# Patient Record
Sex: MALE | Race: WHITE | NOT HISPANIC OR LATINO | Employment: FULL TIME | URBAN - NONMETROPOLITAN AREA
[De-identification: names, ages, dates, MRNs, and addresses within clinical notes are randomized per-mention and may not be internally consistent; named-entity substitution may affect disease eponyms.]

---

## 2017-02-07 ENCOUNTER — OFFICE VISIT (OUTPATIENT)
Dept: RETAIL CLINIC | Facility: CLINIC | Age: 47
End: 2017-02-07

## 2017-02-07 VITALS
SYSTOLIC BLOOD PRESSURE: 144 MMHG | OXYGEN SATURATION: 98 % | TEMPERATURE: 98 F | BODY MASS INDEX: 21.4 KG/M2 | WEIGHT: 158 LBS | RESPIRATION RATE: 16 BRPM | HEART RATE: 70 BPM | HEIGHT: 72 IN | DIASTOLIC BLOOD PRESSURE: 94 MMHG

## 2017-02-07 DIAGNOSIS — R42 DIZZINESS: ICD-10-CM

## 2017-02-07 DIAGNOSIS — H66.003 ACUTE SUPPURATIVE OTITIS MEDIA OF BOTH EARS WITHOUT SPONTANEOUS RUPTURE OF TYMPANIC MEMBRANES, RECURRENCE NOT SPECIFIED: Primary | ICD-10-CM

## 2017-02-07 PROCEDURE — 99213 OFFICE O/P EST LOW 20 MIN: CPT | Performed by: NURSE PRACTITIONER

## 2017-02-07 RX ORDER — AMOXICILLIN AND CLAVULANATE POTASSIUM 875; 125 MG/1; MG/1
1 TABLET, FILM COATED ORAL 2 TIMES DAILY
Qty: 20 TABLET | Refills: 0 | Status: SHIPPED | OUTPATIENT
Start: 2017-02-07 | End: 2017-02-17

## 2017-02-07 NOTE — PROGRESS NOTES
Subjective   Tyree Sears is a 46 y.o. male.   Chief Complaint   Patient presents with   • Dizziness      Dizziness   This is a new problem. The current episode started in the past 7 days. The problem occurs intermittently. The problem has been waxing and waning. Associated symptoms include congestion, fatigue and headaches. Pertinent negatives include no abdominal pain, anorexia, arthralgias, change in bowel habit, chest pain, chills, coughing, diaphoresis, fever, joint swelling, myalgias, nausea, neck pain, numbness, rash, sore throat, vomiting or weakness. The symptoms are aggravated by bending. He has tried nothing for the symptoms.   URI    This is a new problem. The current episode started in the past 7 days. The problem has been gradually worsening. There has been no fever. Associated symptoms include congestion, ear pain, headaches, rhinorrhea and sinus pain. Pertinent negatives include no abdominal pain, chest pain, coughing, diarrhea, joint pain, joint swelling, nausea, neck pain, rash, sore throat, vomiting or wheezing. He has tried nothing for the symptoms.        The following portions of the patient's history were reviewed and updated as appropriate: allergies, current medications, past family history, past medical history, past social history, past surgical history and problem list.    Review of Systems   Constitutional: Positive for fatigue. Negative for chills, diaphoresis and fever.   HENT: Positive for congestion, ear pain, postnasal drip, rhinorrhea and sinus pressure. Negative for drooling, ear discharge, sore throat and trouble swallowing.    Respiratory: Negative for cough, choking, chest tightness, shortness of breath, wheezing and stridor.    Cardiovascular: Negative for chest pain.   Gastrointestinal: Negative for abdominal pain, anorexia, change in bowel habit, diarrhea, nausea and vomiting.   Musculoskeletal: Negative for arthralgias, joint pain, joint swelling, myalgias and neck pain.    Skin: Negative for rash.   Allergic/Immunologic: Positive for environmental allergies.   Neurological: Positive for dizziness and headaches. Negative for seizures, facial asymmetry, weakness and numbness.   Hematological: Negative.    Psychiatric/Behavioral: Negative.        Objective   No Known Allergies    Physical Exam   Constitutional: He is oriented to person, place, and time. He appears well-developed and well-nourished. No distress.   HENT:   Head: Normocephalic and atraumatic.   Right Ear: External ear and ear canal normal. Tympanic membrane is erythematous and bulging. A middle ear effusion is present.   Left Ear: External ear and ear canal normal. Tympanic membrane is erythematous and bulging. A middle ear effusion is present.   Nose: Mucosal edema present. Right sinus exhibits no maxillary sinus tenderness and no frontal sinus tenderness. Left sinus exhibits no maxillary sinus tenderness and no frontal sinus tenderness.   Mouth/Throat: Uvula is midline and mucous membranes are normal. Mucous membranes are not pale, not dry and not cyanotic. Posterior oropharyngeal erythema (mild) present.   Neck: Normal range of motion. Neck supple.   Cardiovascular: Normal heart sounds.    Pulmonary/Chest: Effort normal and breath sounds normal. No respiratory distress. He has no wheezes. He has no rales.   Lymphadenopathy:     He has no cervical adenopathy.   Neurological: He is alert and oriented to person, place, and time.   Skin: Skin is warm and dry. No rash noted. He is not diaphoretic.   Psychiatric: He has a normal mood and affect.       Assessment/Plan   Tyree was seen today for dizziness.    Diagnoses and all orders for this visit:    Acute suppurative otitis media of both ears without spontaneous rupture of tympanic membranes, recurrence not specified  -     amoxicillin-clavulanate (AUGMENTIN) 875-125 MG per tablet; Take 1 tablet by mouth 2 (Two) Times a Day for 10 days.    Dizziness    Advised patient to  begin OTC antihistamine such as Claritin or Zyrtec and OTC nasal steroid spray such as Flonase or Nasacort.             Patient Instructions   Otitis Media, Adult  Otitis media is redness, soreness, and inflammation of the middle ear. Otitis media may be caused by allergies or, most commonly, by infection. Often it occurs as a complication of the common cold.  SIGNS AND SYMPTOMS  Symptoms of otitis media may include:  · Earache.  · Fever.  · Ringing in your ear.  · Headache.  · Leakage of fluid from the ear.  DIAGNOSIS  To diagnose otitis media, your health care provider will examine your ear with an otoscope. This is an instrument that allows your health care provider to see into your ear in order to examine your eardrum. Your health care provider also will ask you questions about your symptoms.  TREATMENT   Typically, otitis media resolves on its own within 3-5 days. Your health care provider may prescribe medicine to ease your symptoms of pain. If otitis media does not resolve within 5 days or is recurrent, your health care provider may prescribe antibiotic medicines if he or she suspects that a bacterial infection is the cause.  HOME CARE INSTRUCTIONS   · If you were prescribed an antibiotic medicine, finish it all even if you start to feel better.  · Take medicines only as directed by your health care provider.  · Keep all follow-up visits as directed by your health care provider.  SEEK MEDICAL CARE IF:  · You have otitis media only in one ear, or bleeding from your nose, or both.  · You notice a lump on your neck.  · You are not getting better in 3-5 days.  · You feel worse instead of better.  SEEK IMMEDIATE MEDICAL CARE IF:   · You have pain that is not controlled with medicine.  · You have swelling, redness, or pain around your ear or stiffness in your neck.  · You notice that part of your face is paralyzed.  · You notice that the bone behind your ear (mastoid) is tender when you touch it.  MAKE SURE YOU:    · Understand these instructions.  · Will watch your condition.  · Will get help right away if you are not doing well or get worse.     This information is not intended to replace advice given to you by your health care provider. Make sure you discuss any questions you have with your health care provider.     Document Released: 09/22/2005 Document Revised: 01/08/2016 Document Reviewed: 07/15/2014  [employer] Interactive Patient Education ©2016 [employer].  Amoxicillin; Clavulanic Acid tablets  What is this medicine?  AMOXICILLIN; CLAVULANIC ACID (a mox i REDDY in; LOURDES [place]jarano ic AS id) is a penicillin antibiotic. It is used to treat certain kinds of bacterial infections. It will not work for colds, flu, or other viral infections.  This medicine may be used for other purposes; ask your health care provider or pharmacist if you have questions.  What should I tell my health care provider before I take this medicine?  They need to know if you have any of these conditions:  -bowel disease, like colitis  -kidney disease  -liver disease  -mononucleosis  -an unusual or allergic reaction to amoxicillin, penicillin, cephalosporin, other antibiotics, clavulanic acid, other medicines, foods, dyes, or preservatives  -pregnant or trying to get pregnant  -breast-feeding  How should I use this medicine?  Take this medicine by mouth with a full glass of water. Follow the directions on the prescription label. Take at the start of a meal. Do not crush or chew. If the tablet has a score line, you may cut it in half at the score line for easier swallowing. Take your medicine at regular intervals. Do not take your medicine more often than directed. Take all of your medicine as directed even if you think you are better. Do not skip doses or stop your medicine early.  Talk to your pediatrician regarding the use of this medicine in children. Special care may be needed.  Overdosage: If you think you have taken too much of this medicine  contact a poison control center or emergency room at once.  NOTE: This medicine is only for you. Do not share this medicine with others.  What if I miss a dose?  If you miss a dose, take it as soon as you can. If it is almost time for your next dose, take only that dose. Do not take double or extra doses.  What may interact with this medicine?  -allopurinol  -anticoagulants  -birth control pills  -methotrexate  -probenecid  This list may not describe all possible interactions. Give your health care provider a list of all the medicines, herbs, non-prescription drugs, or dietary supplements you use. Also tell them if you smoke, drink alcohol, or use illegal drugs. Some items may interact with your medicine.  What should I watch for while using this medicine?  Tell your doctor or health care professional if your symptoms do not improve.  Do not treat diarrhea with over the counter products. Contact your doctor if you have diarrhea that lasts more than 2 days or if it is severe and watery.  If you have diabetes, you may get a false-positive result for sugar in your urine. Check with your doctor or health care professional.  Birth control pills may not work properly while you are taking this medicine. Talk to your doctor about using an extra method of birth control.  What side effects may I notice from receiving this medicine?  Side effects that you should report to your doctor or health care professional as soon as possible:  -allergic reactions like skin rash, itching or hives, swelling of the face, lips, or tongue  -breathing problems  -dark urine  -fever or chills, sore throat  -redness, blistering, peeling or loosening of the skin, including inside the mouth  -seizures  -trouble passing urine or change in the amount of urine  -unusual bleeding, bruising  -unusually weak or tired  -white patches or sores in the mouth or throat  Side effects that usually do not require medical attention (report to your doctor or  health care professional if they continue or are bothersome):  -diarrhea  -dizziness  -headache  -nausea, vomiting  -stomach upset  -vaginal or anal irritation  This list may not describe all possible side effects. Call your doctor for medical advice about side effects. You may report side effects to FDA at 6-295-GYV-3123.  Where should I keep my medicine?  Keep out of the reach of children.  Store at room temperature below 25 degrees C (77 degrees F). Keep container tightly closed. Throw away any unused medicine after the expiration date.  NOTE: This sheet is a summary. It may not cover all possible information. If you have questions about this medicine, talk to your doctor, pharmacist, or health care provider.     © 2016, [employer]. (2009-03-12 12:04:30)  Vertigo  Vertigo means you feel like you or your surroundings are moving when they are not. Vertigo can be dangerous if it occurs when you are at work, driving, or performing difficult activities.   CAUSES   Vertigo occurs when there is a conflict of signals sent to your brain from the visual and sensory systems in your body. There are many different causes of vertigo, including:  · Infections, especially in the inner ear.  · A bad reaction to a drug or misuse of alcohol and medicines.  · Withdrawal from drugs or alcohol.  · Rapidly changing positions, such as lying down or rolling over in bed.  · A migraine headache.  · Decreased blood flow to the brain.  · Increased pressure in the brain from a head injury, infection, tumor, or bleeding.  SYMPTOMS   You may feel as though the world is spinning around or you are falling to the ground. Because your balance is upset, vertigo can cause nausea and vomiting. You may have involuntary eye movements (nystagmus).  DIAGNOSIS   Vertigo is usually diagnosed by physical exam. If the cause of your vertigo is unknown, your caregiver may perform imaging tests, such as an MRI scan (magnetic resonance  imaging).  TREATMENT   Most cases of vertigo resolve on their own, without treatment. Depending on the cause, your caregiver may prescribe certain medicines. If your vertigo is related to body position issues, your caregiver may recommend movements or procedures to correct the problem. In rare cases, if your vertigo is caused by certain inner ear problems, you may need surgery.  HOME CARE INSTRUCTIONS   · Follow your caregiver's instructions.  · Avoid driving.  · Avoid operating heavy machinery.  · Avoid performing any tasks that would be dangerous to you or others during a vertigo episode.  · Tell your caregiver if you notice that certain medicines seem to be causing your vertigo. Some of the medicines used to treat vertigo episodes can actually make them worse in some people.  SEEK IMMEDIATE MEDICAL CARE IF:   · Your medicines do not relieve your vertigo or are making it worse.  · You develop problems with talking, walking, weakness, or using your arms, hands, or legs.  · You develop severe headaches.  · Your nausea or vomiting continues or gets worse.  · You develop visual changes.  · A family member notices behavioral changes.  · Your condition gets worse.  MAKE SURE YOU:  · Understand these instructions.  · Will watch your condition.  · Will get help right away if you are not doing well or get worse.     This information is not intended to replace advice given to you by your health care provider. Make sure you discuss any questions you have with your health care provider.     Document Released: 09/27/2006 Document Revised: 03/11/2013 Document Reviewed: 04/11/2016  [employer] Interactive Patient Education ©2016 [employer].

## 2017-02-07 NOTE — PATIENT INSTRUCTIONS
Otitis Media, Adult  Otitis media is redness, soreness, and inflammation of the middle ear. Otitis media may be caused by allergies or, most commonly, by infection. Often it occurs as a complication of the common cold.  SIGNS AND SYMPTOMS  Symptoms of otitis media may include:  · Earache.  · Fever.  · Ringing in your ear.  · Headache.  · Leakage of fluid from the ear.  DIAGNOSIS  To diagnose otitis media, your health care provider will examine your ear with an otoscope. This is an instrument that allows your health care provider to see into your ear in order to examine your eardrum. Your health care provider also will ask you questions about your symptoms.  TREATMENT   Typically, otitis media resolves on its own within 3-5 days. Your health care provider may prescribe medicine to ease your symptoms of pain. If otitis media does not resolve within 5 days or is recurrent, your health care provider may prescribe antibiotic medicines if he or she suspects that a bacterial infection is the cause.  HOME CARE INSTRUCTIONS   · If you were prescribed an antibiotic medicine, finish it all even if you start to feel better.  · Take medicines only as directed by your health care provider.  · Keep all follow-up visits as directed by your health care provider.  SEEK MEDICAL CARE IF:  · You have otitis media only in one ear, or bleeding from your nose, or both.  · You notice a lump on your neck.  · You are not getting better in 3-5 days.  · You feel worse instead of better.  SEEK IMMEDIATE MEDICAL CARE IF:   · You have pain that is not controlled with medicine.  · You have swelling, redness, or pain around your ear or stiffness in your neck.  · You notice that part of your face is paralyzed.  · You notice that the bone behind your ear (mastoid) is tender when you touch it.  MAKE SURE YOU:   · Understand these instructions.  · Will watch your condition.  · Will get help right away if you are not doing well or get worse.     This  information is not intended to replace advice given to you by your health care provider. Make sure you discuss any questions you have with your health care provider.     Document Released: 09/22/2005 Document Revised: 01/08/2016 Document Reviewed: 07/15/2014  [employer] Interactive Patient Education ©2016 [employer].  Amoxicillin; Clavulanic Acid tablets  What is this medicine?  AMOXICILLIN; CLAVULANIC ACID (a mox i REDDY in; LOURDES [place] ic AS id) is a penicillin antibiotic. It is used to treat certain kinds of bacterial infections. It will not work for colds, flu, or other viral infections.  This medicine may be used for other purposes; ask your health care provider or pharmacist if you have questions.  What should I tell my health care provider before I take this medicine?  They need to know if you have any of these conditions:  -bowel disease, like colitis  -kidney disease  -liver disease  -mononucleosis  -an unusual or allergic reaction to amoxicillin, penicillin, cephalosporin, other antibiotics, clavulanic acid, other medicines, foods, dyes, or preservatives  -pregnant or trying to get pregnant  -breast-feeding  How should I use this medicine?  Take this medicine by mouth with a full glass of water. Follow the directions on the prescription label. Take at the start of a meal. Do not crush or chew. If the tablet has a score line, you may cut it in half at the score line for easier swallowing. Take your medicine at regular intervals. Do not take your medicine more often than directed. Take all of your medicine as directed even if you think you are better. Do not skip doses or stop your medicine early.  Talk to your pediatrician regarding the use of this medicine in children. Special care may be needed.  Overdosage: If you think you have taken too much of this medicine contact a poison control center or emergency room at once.  NOTE: This medicine is only for you. Do not share this medicine with others.  What if I  miss a dose?  If you miss a dose, take it as soon as you can. If it is almost time for your next dose, take only that dose. Do not take double or extra doses.  What may interact with this medicine?  -allopurinol  -anticoagulants  -birth control pills  -methotrexate  -probenecid  This list may not describe all possible interactions. Give your health care provider a list of all the medicines, herbs, non-prescription drugs, or dietary supplements you use. Also tell them if you smoke, drink alcohol, or use illegal drugs. Some items may interact with your medicine.  What should I watch for while using this medicine?  Tell your doctor or health care professional if your symptoms do not improve.  Do not treat diarrhea with over the counter products. Contact your doctor if you have diarrhea that lasts more than 2 days or if it is severe and watery.  If you have diabetes, you may get a false-positive result for sugar in your urine. Check with your doctor or health care professional.  Birth control pills may not work properly while you are taking this medicine. Talk to your doctor about using an extra method of birth control.  What side effects may I notice from receiving this medicine?  Side effects that you should report to your doctor or health care professional as soon as possible:  -allergic reactions like skin rash, itching or hives, swelling of the face, lips, or tongue  -breathing problems  -dark urine  -fever or chills, sore throat  -redness, blistering, peeling or loosening of the skin, including inside the mouth  -seizures  -trouble passing urine or change in the amount of urine  -unusual bleeding, bruising  -unusually weak or tired  -white patches or sores in the mouth or throat  Side effects that usually do not require medical attention (report to your doctor or health care professional if they continue or are bothersome):  -diarrhea  -dizziness  -headache  -nausea, vomiting  -stomach upset  -vaginal or anal  irritation  This list may not describe all possible side effects. Call your doctor for medical advice about side effects. You may report side effects to FDA at 1-292-HPC-7577.  Where should I keep my medicine?  Keep out of the reach of children.  Store at room temperature below 25 degrees C (77 degrees F). Keep container tightly closed. Throw away any unused medicine after the expiration date.  NOTE: This sheet is a summary. It may not cover all possible information. If you have questions about this medicine, talk to your doctor, pharmacist, or health care provider.     © 2016, [employer]. (2009-03-12 12:04:30)  Vertigo  Vertigo means you feel like you or your surroundings are moving when they are not. Vertigo can be dangerous if it occurs when you are at work, driving, or performing difficult activities.   CAUSES   Vertigo occurs when there is a conflict of signals sent to your brain from the visual and sensory systems in your body. There are many different causes of vertigo, including:  · Infections, especially in the inner ear.  · A bad reaction to a drug or misuse of alcohol and medicines.  · Withdrawal from drugs or alcohol.  · Rapidly changing positions, such as lying down or rolling over in bed.  · A migraine headache.  · Decreased blood flow to the brain.  · Increased pressure in the brain from a head injury, infection, tumor, or bleeding.  SYMPTOMS   You may feel as though the world is spinning around or you are falling to the ground. Because your balance is upset, vertigo can cause nausea and vomiting. You may have involuntary eye movements (nystagmus).  DIAGNOSIS   Vertigo is usually diagnosed by physical exam. If the cause of your vertigo is unknown, your caregiver may perform imaging tests, such as an MRI scan (magnetic resonance imaging).  TREATMENT   Most cases of vertigo resolve on their own, without treatment. Depending on the cause, your caregiver may prescribe certain medicines. If  your vertigo is related to body position issues, your caregiver may recommend movements or procedures to correct the problem. In rare cases, if your vertigo is caused by certain inner ear problems, you may need surgery.  HOME CARE INSTRUCTIONS   · Follow your caregiver's instructions.  · Avoid driving.  · Avoid operating heavy machinery.  · Avoid performing any tasks that would be dangerous to you or others during a vertigo episode.  · Tell your caregiver if you notice that certain medicines seem to be causing your vertigo. Some of the medicines used to treat vertigo episodes can actually make them worse in some people.  SEEK IMMEDIATE MEDICAL CARE IF:   · Your medicines do not relieve your vertigo or are making it worse.  · You develop problems with talking, walking, weakness, or using your arms, hands, or legs.  · You develop severe headaches.  · Your nausea or vomiting continues or gets worse.  · You develop visual changes.  · A family member notices behavioral changes.  · Your condition gets worse.  MAKE SURE YOU:  · Understand these instructions.  · Will watch your condition.  · Will get help right away if you are not doing well or get worse.     This information is not intended to replace advice given to you by your health care provider. Make sure you discuss any questions you have with your health care provider.     Document Released: 09/27/2006 Document Revised: 03/11/2013 Document Reviewed: 04/11/2016  [employer] Interactive Patient Education ©2016 [employer].

## 2019-11-01 ENCOUNTER — OFFICE VISIT (OUTPATIENT)
Dept: FAMILY MEDICINE CLINIC | Facility: CLINIC | Age: 49
End: 2019-11-01

## 2019-11-01 VITALS
RESPIRATION RATE: 16 BRPM | OXYGEN SATURATION: 99 % | HEIGHT: 68 IN | DIASTOLIC BLOOD PRESSURE: 98 MMHG | BODY MASS INDEX: 25.94 KG/M2 | HEART RATE: 70 BPM | SYSTOLIC BLOOD PRESSURE: 162 MMHG | WEIGHT: 171.2 LBS

## 2019-11-01 DIAGNOSIS — G89.29 CHRONIC RIGHT SHOULDER PAIN: Primary | ICD-10-CM

## 2019-11-01 DIAGNOSIS — M25.511 CHRONIC RIGHT SHOULDER PAIN: Primary | ICD-10-CM

## 2019-11-01 PROCEDURE — 99213 OFFICE O/P EST LOW 20 MIN: CPT | Performed by: PHYSICIAN ASSISTANT

## 2019-11-01 RX ORDER — NABUMETONE 500 MG/1
500 TABLET, FILM COATED ORAL 2 TIMES DAILY PRN
Qty: 20 TABLET | Refills: 0 | Status: SHIPPED | OUTPATIENT
Start: 2019-11-01 | End: 2019-12-05

## 2019-11-01 NOTE — PROGRESS NOTES
Subjective   Tyree Sears is a 49 y.o. male  Establish Care (Prev pt of Dr. Ramos ([facility])) and Shoulder Pain (Rt shoulder for several months, burning sensation down to hand. Pain is constant 8/10. No known injury-treating with ibuprofen.)      History of Present Illness  Patient is a pleasant 49-year-old white male who comes in complain right shoulder pain patient is unable to put arm above head describes pain is 8 out of 10 has had x-rays done unremarkable unable to sleep on left shoulder describes pain as sharp stabbing 9 out of 10 worse when sitting standing for long periods time any kind of movement above his head causes pain.  The following portions of the patient's history were reviewed and updated as appropriate: allergies, current medications, past social history and problem list    Review of Systems   Constitutional: Negative for appetite change, diaphoresis, fatigue and unexpected weight change.   Eyes: Negative for visual disturbance.   Respiratory: Negative for cough, chest tightness and shortness of breath.    Cardiovascular: Negative for chest pain, palpitations and leg swelling.   Gastrointestinal: Negative for diarrhea, nausea and vomiting.   Endocrine: Negative for polydipsia, polyphagia and polyuria.   Musculoskeletal:        Rt shoulder pain   Skin: Negative for color change and rash.   Neurological: Negative for dizziness, syncope, weakness, light-headedness, numbness and headaches.       Objective     Vitals:    11/01/19 1059   BP: 162/98   Pulse: 70   Resp: 16   SpO2: 99%       Physical Exam   Constitutional: He appears well-developed and well-nourished.   Neck: No JVD present.   Cardiovascular: Normal rate, regular rhythm, normal heart sounds, intact distal pulses and normal pulses.   No murmur heard.  Pulmonary/Chest: Effort normal and breath sounds normal. No respiratory distress.   Abdominal: Soft. Bowel sounds are normal. There is no hepatosplenomegaly. There is no tenderness.    Musculoskeletal: He exhibits no edema.        Right shoulder: He exhibits decreased range of motion and tenderness.        Left shoulder: He exhibits decreased range of motion, tenderness and bony tenderness.        Arms:  Skin: Skin is warm and dry.   Nursing note and vitals reviewed.      Assessment/Plan     Diagnoses and all orders for this visit:    Chronic right shoulder pain  -     nabumetone (RELAFEN) 500 MG tablet; Take 1 tablet by mouth 2 (Two) Times a Day As Needed for Mild Pain .  -     MRI Shoulder Right Without Contrast; Future    Follow-up as needed

## 2019-11-12 ENCOUNTER — HOSPITAL ENCOUNTER (OUTPATIENT)
Dept: MRI IMAGING | Facility: HOSPITAL | Age: 49
Discharge: HOME | End: 2019-11-12
Admitting: PHYSICIAN ASSISTANT

## 2019-11-12 DIAGNOSIS — G89.29 CHRONIC RIGHT SHOULDER PAIN: ICD-10-CM

## 2019-11-12 DIAGNOSIS — M25.511 CHRONIC RIGHT SHOULDER PAIN: ICD-10-CM

## 2019-11-12 PROCEDURE — 73221 MRI JOINT UPR EXTREM W/O DYE: CPT

## 2019-11-13 ENCOUNTER — TRANSCRIBE ORDERS (OUTPATIENT)
Dept: FAMILY MEDICINE CLINIC | Facility: CLINIC | Age: 49
End: 2019-11-13

## 2019-11-13 DIAGNOSIS — M75.111 INCOMPLETE ROTATOR CUFF TEAR OR RUPTURE OF RIGHT SHOULDER, NOT SPECIFIED AS TRAUMATIC: Primary | ICD-10-CM

## 2019-12-05 ENCOUNTER — OFFICE VISIT (OUTPATIENT)
Dept: ORTHOPEDIC SURGERY | Facility: CLINIC | Age: 49
End: 2019-12-05

## 2019-12-05 VITALS — BODY MASS INDEX: 25.07 KG/M2 | OXYGEN SATURATION: 98 % | HEIGHT: 68 IN | WEIGHT: 165.4 LBS | HEART RATE: 90 BPM

## 2019-12-05 DIAGNOSIS — M75.01 ADHESIVE CAPSULITIS OF RIGHT SHOULDER: ICD-10-CM

## 2019-12-05 DIAGNOSIS — M25.511 RIGHT SHOULDER PAIN, UNSPECIFIED CHRONICITY: Primary | ICD-10-CM

## 2019-12-05 DIAGNOSIS — M75.111 INCOMPLETE TEAR OF RIGHT ROTATOR CUFF, UNSPECIFIED WHETHER TRAUMATIC: ICD-10-CM

## 2019-12-05 DIAGNOSIS — M19.011 ARTHRITIS OF RIGHT ACROMIOCLAVICULAR JOINT: ICD-10-CM

## 2019-12-05 DIAGNOSIS — M54.2 CERVICALGIA: ICD-10-CM

## 2019-12-05 PROCEDURE — 20605 DRAIN/INJ JOINT/BURSA W/O US: CPT | Performed by: ORTHOPAEDIC SURGERY

## 2019-12-05 PROCEDURE — 99204 OFFICE O/P NEW MOD 45 MIN: CPT | Performed by: ORTHOPAEDIC SURGERY

## 2019-12-05 RX ORDER — TRIAMCINOLONE ACETONIDE 40 MG/ML
20 INJECTION, SUSPENSION INTRA-ARTICULAR; INTRAMUSCULAR
Status: COMPLETED | OUTPATIENT
Start: 2019-12-05 | End: 2019-12-05

## 2019-12-05 RX ORDER — LIDOCAINE HYDROCHLORIDE 10 MG/ML
1 INJECTION, SOLUTION EPIDURAL; INFILTRATION; INTRACAUDAL; PERINEURAL
Status: COMPLETED | OUTPATIENT
Start: 2019-12-05 | End: 2019-12-05

## 2019-12-05 RX ORDER — IBUPROFEN 200 MG
200 TABLET ORAL EVERY 6 HOURS PRN
COMMUNITY
End: 2021-04-20

## 2019-12-05 RX ADMIN — TRIAMCINOLONE ACETONIDE 20 MG: 40 INJECTION, SUSPENSION INTRA-ARTICULAR; INTRAMUSCULAR at 11:17

## 2019-12-05 RX ADMIN — LIDOCAINE HYDROCHLORIDE 1 ML: 10 INJECTION, SOLUTION EPIDURAL; INFILTRATION; INTRACAUDAL; PERINEURAL at 11:17

## 2019-12-05 NOTE — PROGRESS NOTES
[facility] [place]    Subjective     Pain of the Right Shoulder (Right shoulder pain; Ongoing about 1 year-worse last 3 months; Shoulder injury about 15 years ago; Pain 7-8/10; Ibuprofen and Tylenol help some; soaking in hot water helps the most)      ALBERT Sears is a 49 y.o. male.  Patient is here today for evaluation of right shoulder pain.  This has been going on for the past year but is worsened over the past 3 months.  He is right-hand dominant.  He is tried over-the-counter ibuprofen and Tylenol which is helped him a little bit.  His main complaints are of pain in the shoulder and burning that radiates down the arm into the forearm and into the dorsum of his hand.  He has a bad neck as well.  He is here at the request of Brendan Ferguson for further evaluation and treatment.    Past Medical History:   Diagnosis Date   • Arthritis    • GERD (gastroesophageal reflux disease)    • Headache    • Sinusitis       History reviewed. No pertinent surgical history.   Family History   Problem Relation Age of Onset   • Lung disease Mother    • Cancer Mother    • Heart disease Father      Social History     Socioeconomic History   • Marital status: Unknown     Spouse name: Not on file   • Number of children: Not on file   • Years of education: Not on file   • Highest education level: Not on file   Tobacco Use   • Smoking status: Current Every Day Smoker     Packs/day: 2.00     Years: 20.00     Pack years: 40.00     Types: Cigarettes   • Smokeless tobacco: Never Used   Substance and Sexual Activity   • Alcohol use: No   • Drug use: No   • Sexual activity: Yes     Partners: Female      Current Outpatient Medications on File Prior to Visit   Medication Sig Dispense Refill   • ibuprofen (ADVIL,MOTRIN) 200 MG tablet Take 200 mg by mouth Every 6 (Six) Hours As Needed for Mild Pain .     • [DISCONTINUED] nabumetone (RELAFEN) 500 MG tablet Take 1 tablet by mouth 2 (Two) Times a Day As Needed for Mild Pain  ". 20 tablet 0     No current facility-administered medications on file prior to visit.       No Known Allergies     The following portions of the patient's history were reviewed and updated as appropriate: allergies, current medications, past family history, past medical history, past social history, past surgical history and problem list.    Review of Systems   Constitutional: Negative.    HENT: Negative.    Eyes: Positive for itching.   Respiratory: Positive for wheezing.    Cardiovascular: Negative.    Endocrine: Negative.    Genitourinary: Negative.    Musculoskeletal: Positive for arthralgias, neck pain and neck stiffness.   Skin: Negative.    Allergic/Immunologic: Negative.    Neurological: Negative.    Hematological: Negative.    Psychiatric/Behavioral: Negative.         Objective      Physical Exam  Pulse 90   Ht 172.7 cm (67.99\")   Wt 75 kg (165 lb 6.4 oz)   SpO2 98%   BMI 25.15 kg/m²     Body mass index is 25.15 kg/m².    General  Mental Status - alert  General Appearance - cooperative, well groomed, not in acute distress  Orientation - Oriented X3  Build & Nutrition - well developed and well nourished  Posture - normal posture  Gait - normal gait     Integumentary  Global Assessment  Examination of related systems reveals - no lymphadenopathy  Ears:  No abnormality  Nose:  No mucous drainage  General Characteristics  Overall examination of the patient's skin reveals - no rashes, no evidence of scars, no suspicious lesions and no bruises.  Color - normal coloration of skin.  Vascular: Brisk capillary refill in all extremities    Ortho Exam  Musculoskeletal   Upper Extremity   Right Shoulder     Inspection and Palpation:     Medial border scapular tenderness-none    AC Joint Tenderness -moderate    Sensation is normal    Examination reveals no ecchymosis.        Strength and Tone:    Supraspinatus - 5/5 with mild pain    External Rotators-5/5    Infraspinatus - 5/5    Subscapularis - 5/5 with mild pain " only    Deltoid - 5/5     Range of Motion      RightShoulder:    Internal Rotation: ROM -L5-S1    External Rotation: AROM - 60 degrees    Elevation through flexion: AROM -125 degrees        Impingement   Right shoulder    Arroyo-Blaine impingement test positive    Neer impingement test positive     Functional Testing   Right shoulder    AC crossover adduction test positive    Speeds test negative    Uppercut test negative    O'Briens test negative    Drop arm sign negative    Apprehension relocation negative      Imaging/Studies  Imaging Results (Last 24 Hours)     Procedure Component Value Units Date/Time    XR Shoulder 2+ View Right [[id number]] Resulted:  12/05/19 1102     Updated:  12/05/19 1102    Narrative:       Right Shoulder X-Ray    Indication: Pain    Study:  AP, axillary lateral, and scapular Y views    Comparison: None    Findings:  No acute fractures are visualized  No bony lesions are visualized.  Normal soft tissue appearance  AC joint: Severe joint space narrowing  Glenohumeral joint: Minimal joint space narrowing  Acromion type: 2  Change the greater tuberosity consistent with chronic cuff pathology      Impression:    No acute bony abnormalities noted  Severe AC joint arthritis.  Changes at greater tuberosity consistent with   chronic cuff pathology.            MRI right shoulder 11/12/2019:    IMPRESSION:  1.  High-grade partial thickness tearing and tendinopathy of the  subscapularis.  2.  Abnormal increased signal extending of undercutting the  anterior-superior glenoid labrum around the anterior margin concerning  for GLAD lesion (glenohumeral articular disruption lesion). Glenoid  periosteum intact along the anterior margin.      D:  11/12/2019  E:  11/12/2019     This report was finalized on 11/12/2019 7:07 PM by Dr. Vivek Mondragon.      Assessment:  1. Right shoulder pain, unspecified chronicity    2. Incomplete tear of right rotator cuff, unspecified whether traumatic    3. Arthritis of  right acromioclavicular joint    4. Cervicalgia    5. Adhesive capsulitis of right shoulder        Plan:  1. Continue over-the-counter medication as needed for discomfort  2. Right shoulder pain with mild adhesive capsulitis right shoulder--observe for now.  Likely related to his cervicalgia.  Consider modalities to the glenohumeral joint if he returns and is no better after the AC joint injection.  This would also address his partial-thickness subscapularis tear.  3. Cervicalgia--most likely explanation for the patient's presentation and symptoms.  Sometimes this can be mimicked by severe AC joint arthritis.  Diagnostic and therapeutic injection will be given into the AC joint if he is a lot better, consider further imaging to his cervical spine.        Karl Crawford MD  12/05/19  11:23 AM

## 2019-12-05 NOTE — PROGRESS NOTES
Procedure   Medium Joint Arthrocentesis: R acromioclavicular  Date/Time: 12/5/2019 11:17 AM  Consent given by: patient  Site marked: site marked  Timeout: Immediately prior to procedure a time out was called to verify the correct patient, procedure, equipment, support staff and site/side marked as required   Supporting Documentation  Indications: pain   Procedure Details  Location: shoulder - R acromioclavicular  Preparation: Patient was prepped and draped in the usual sterile fashion  Needle size: 25 G  Approach: posterior  Medications administered: 20 mg triamcinolone acetonide 40 MG/ML; 1 mL lidocaine PF 1% 1 %  Patient tolerance: patient tolerated the procedure well with no immediate complications

## 2020-01-16 ENCOUNTER — OFFICE VISIT (OUTPATIENT)
Dept: ORTHOPEDIC SURGERY | Facility: CLINIC | Age: 50
End: 2020-01-16

## 2020-01-16 ENCOUNTER — HOSPITAL ENCOUNTER (OUTPATIENT)
Dept: MRI IMAGING | Facility: HOSPITAL | Age: 50
Discharge: HOME | End: 2020-01-16
Admitting: ORTHOPAEDIC SURGERY

## 2020-01-16 VITALS — WEIGHT: 163.8 LBS | BODY MASS INDEX: 24.83 KG/M2 | OXYGEN SATURATION: 99 % | HEART RATE: 69 BPM | HEIGHT: 68 IN

## 2020-01-16 DIAGNOSIS — M25.511 RIGHT SHOULDER PAIN, UNSPECIFIED CHRONICITY: Primary | ICD-10-CM

## 2020-01-16 DIAGNOSIS — M75.111 INCOMPLETE TEAR OF RIGHT ROTATOR CUFF, UNSPECIFIED WHETHER TRAUMATIC: ICD-10-CM

## 2020-01-16 DIAGNOSIS — M54.2 CERVICALGIA: ICD-10-CM

## 2020-01-16 DIAGNOSIS — M19.011 ARTHRITIS OF RIGHT ACROMIOCLAVICULAR JOINT: ICD-10-CM

## 2020-01-16 DIAGNOSIS — M75.01 ADHESIVE CAPSULITIS OF RIGHT SHOULDER: ICD-10-CM

## 2020-01-16 PROCEDURE — 99213 OFFICE O/P EST LOW 20 MIN: CPT | Performed by: ORTHOPAEDIC SURGERY

## 2020-01-16 PROCEDURE — 72141 MRI NECK SPINE W/O DYE: CPT

## 2020-01-16 NOTE — PROGRESS NOTES
"    [facility] [place] [facility]        Subjective     CC: Follow-up (6 week follow up - Right shoulder pain, unspecified chronicity - Right ACJ injection given 12/05/19)      ALBERT Sears is a 49 y.o. male.  Patient is here today for follow-up of his right shoulder pain and right upper extremity numbness and tingling and burning.  We injected his AC joint at his last visit and he got very little to no relief from that injection.  He continues to complain of burning in the right upper extremity.  His neck hurts as well.      ROS:    Constiutional:Pt denies fever, chills, nausea, or vomiting.  MSK:as above        Objective      Past Medical History  Past Medical History:   Diagnosis Date   • Arthritis    • GERD (gastroesophageal reflux disease)    • Headache    • Sinusitis          Physical Exam  Pulse 69   Ht 172.7 cm (67.99\")   Wt 74.3 kg (163 lb 12.8 oz)   SpO2 99%   BMI 24.91 kg/m²     Body mass index is 24.91 kg/m².    Patient is well nourished and well developed.        Ortho Exam  Forward elevation 150  External rotation 70  5-5 deltoid  5-5 subscapularis with mild discomfort  Patient has reproduction of his symptoms with range of motion of the neck, particularly extension and rotation to the right    Imaging/Labs/EMG Reviewed:  Imaging Results (Last 24 Hours)     ** No results found for the last 24 hours. **          Assessment    Assessment:  1. Right shoulder pain, unspecified chronicity    2. Incomplete tear of right rotator cuff, unspecified whether traumatic    3. Arthritis of right acromioclavicular joint    4. Cervicalgia    5. Adhesive capsulitis of right shoulder        Plan:  1. Recommend over the counter anti-inflammatories for pain and/or swelling  2. Cervicalgia with numbness and tingling in the right upper extremity--plan will be for an MRI of the cervical spine  3. Partial-thickness subscapularis tear and right AC joint arthritis with adhesive capsulitis of the shoulder--the " stiffness has resolved.  At this point, I believe his shoulder issues are secondary to his cervical spine.  We will order an MRI of his cervical spine and see him back to review that study and likely facilitate spine referral.      Karl Crawford MD  01/16/20  10:13 AM

## 2020-01-17 DIAGNOSIS — M54.2 CERVICALGIA: Primary | ICD-10-CM

## 2020-01-20 ENCOUNTER — TELEPHONE (OUTPATIENT)
Dept: ORTHOPEDIC SURGERY | Facility: CLINIC | Age: 50
End: 2020-01-20

## 2020-01-20 NOTE — TELEPHONE ENCOUNTER
Does patient need to follow up tomorrow?  I called him last week and gave him results of MRI.    Evon

## 2020-01-20 NOTE — TELEPHONE ENCOUNTER
I called patient to let him know no need to come to appt tomorrow with Dr. Crawford.  I cancelled the appt.  Evon

## 2020-01-23 ENCOUNTER — OFFICE VISIT (OUTPATIENT)
Dept: NEUROSURGERY | Facility: CLINIC | Age: 50
End: 2020-01-23

## 2020-01-23 ENCOUNTER — LAB (OUTPATIENT)
Dept: LAB | Facility: HOSPITAL | Age: 50
End: 2020-01-23

## 2020-01-23 VITALS
BODY MASS INDEX: 23.1 KG/M2 | SYSTOLIC BLOOD PRESSURE: 142 MMHG | WEIGHT: 165 LBS | TEMPERATURE: 98.5 F | DIASTOLIC BLOOD PRESSURE: 86 MMHG | HEIGHT: 71 IN

## 2020-01-23 DIAGNOSIS — M19.90 CHRONIC ARTHRITIS: ICD-10-CM

## 2020-01-23 DIAGNOSIS — Z72.0 TOBACCO ABUSE: ICD-10-CM

## 2020-01-23 DIAGNOSIS — M19.90 CHRONIC ARTHRITIS: Primary | ICD-10-CM

## 2020-01-23 LAB
CRP SERPL-MCNC: 0.39 MG/DL (ref 0–0.5)
ERYTHROCYTE [SEDIMENTATION RATE] IN BLOOD: 2 MM/HR (ref 0–15)

## 2020-01-23 PROCEDURE — 36415 COLL VENOUS BLD VENIPUNCTURE: CPT

## 2020-01-23 PROCEDURE — 84155 ASSAY OF PROTEIN SERUM: CPT

## 2020-01-23 PROCEDURE — 99202 OFFICE O/P NEW SF 15 MIN: CPT | Performed by: NEUROLOGICAL SURGERY

## 2020-01-23 PROCEDURE — 84165 PROTEIN E-PHORESIS SERUM: CPT

## 2020-01-23 PROCEDURE — 86140 C-REACTIVE PROTEIN: CPT

## 2020-01-23 PROCEDURE — 85652 RBC SED RATE AUTOMATED: CPT

## 2020-01-23 NOTE — PROGRESS NOTES
NAME: SAUL QUIROGA   DOS: 2020  : 1970  PCP: Colby Ferguson PA    Chief Complaint:    Chief Complaint   Patient presents with   • Neck & right arm pain       History of Present Illness:  49 y.o. male   I thought was very interesting 49-year-old gentleman with a history of neck and right arm pain.  He reports that his neck has been stable through the course of the years she has arthritis he occasionally has low back pain and threw his back out twice in the last 6 months he reports an interesting episode where he was trying to pull some cables while laying on his back he felt a pop in his right shoulder and has a history of prior dislocation treated in the past and since that time had excruciating pain in his shoulder that got somewhat better and then he did have some tingling in his hand but really denies any changes in his neck pain he did have some  strength weakness difficulty with abduction on the right but denies any progressive symptomatology no lower extremity symptoms etc. he is here for evaluation and AC joint injection kind of helped    PMHX  Allergies:  No Known Allergies  Medications    Current Outpatient Medications:   •  ibuprofen (ADVIL,MOTRIN) 200 MG tablet, Take 200 mg by mouth Every 6 (Six) Hours As Needed for Mild Pain ., Disp: , Rfl:   Past Medical History:  Past Medical History:   Diagnosis Date   • Arthritis    • GERD (gastroesophageal reflux disease)    • Headache    • Sinusitis      Past Surgical History:  No past surgical history on file.  Social Hx:  Social History     Tobacco Use   • Smoking status: Current Every Day Smoker     Packs/day: 2.00     Years: 20.00     Pack years: 40.00     Types: Cigarettes   • Smokeless tobacco: Never Used   Substance Use Topics   • Alcohol use: No   • Drug use: No     Family Hx:  Family History   Problem Relation Age of Onset   • Lung disease Mother    • Cancer Mother    • Heart disease Father      Review of Systems:         Review of Systems   Constitutional: Negative for activity change, appetite change, chills, diaphoresis, fatigue, fever and unexpected weight change.   HENT: Negative for congestion, dental problem, drooling, ear discharge, ear pain, facial swelling, hearing loss, mouth sores, nosebleeds, postnasal drip, rhinorrhea, sinus pressure, sneezing, sore throat, tinnitus, trouble swallowing and voice change.    Eyes: Negative for photophobia, pain, discharge, redness, itching and visual disturbance.   Respiratory: Negative for apnea, cough, choking, chest tightness, shortness of breath, wheezing and stridor.    Cardiovascular: Negative for chest pain, palpitations and leg swelling.   Gastrointestinal: Negative for abdominal distention, abdominal pain, anal bleeding, blood in stool, constipation, diarrhea, nausea, rectal pain and vomiting.   Endocrine: Negative for cold intolerance, heat intolerance, polydipsia, polyphagia and polyuria.   Genitourinary: Negative for decreased urine volume, difficulty urinating, dysuria, enuresis, flank pain, frequency, genital sores, hematuria and urgency.   Musculoskeletal: Positive for arthralgias, joint swelling, myalgias, neck pain and neck stiffness. Negative for back pain and gait problem.   Skin: Negative for color change, pallor, rash and wound.   Allergic/Immunologic: Negative for environmental allergies, food allergies and immunocompromised state.   Neurological: Positive for weakness and numbness. Negative for dizziness, tremors, seizures, syncope, facial asymmetry, speech difficulty, light-headedness and headaches.   Hematological: Negative for adenopathy. Does not bruise/bleed easily.   Psychiatric/Behavioral: Negative for agitation, behavioral problems, confusion, decreased concentration, dysphoric mood, hallucinations, self-injury, sleep disturbance and suicidal ideas. The patient is not nervous/anxious and is not hyperactive.       I have reviewed this note template and  all pertinent parts of the review of systems social, family history, surgical history and medication list      Physical Examination:  Vitals:    01/23/20 1252   BP: 142/86   Temp: 98.5 °F (36.9 °C)      General Appearance:   Well developed, well nourished, well groomed, alert, and cooperative.  Neurological examination:  Neurologic Exam  Vital signs were reviewed and documented in the chart  Patient appeared in good neurologic function with normal comprehension fluent speech  Mood and affect are normal  Sense of smell deferred    Pupils symmetric equally reactive funduscopic exam not visualized   Visual fields intact to confrontation  Extraocular movements intact  Face motor function is symmetric  Facial sensations normal  Hearing intact to finger rub hearing intact to finger rub  Tongue is midline  Palate symmetric  Swallowing normal  Shoulder shrug normal    Muscle bulk and tone normal  5 out of 5 strength no motor drift  Gait normal intact  Negative Romberg  No clonus long tract signs or myelopathy  Stigmata of arthritis in the neck he has no evidence of Spurling's his range of motion in his shoulders is poor on the right with some tenderness at his AC joint  Reflexes symmetric trace throughout  No edema noted and extremities skin appears normal    Straight leg raise sign absent  No signs of intrinsic hip dysfunction  Back is without any lesions or abnormality  Feet are warm and well perfused        Review of Imaging/DATA:  I reviewed an MRI of his cervical spine interestingly is hypodensity T1 signal change abnormality at C5-6 it is difficult to ascertain he has no exophytic processes no fevers no chills etc. but he may have adjacent level atypical hemangiomas at C5 and C6  Diagnoses/Plan:    Mr. Sears is a 49 y.o. 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

## 2020-01-26 ENCOUNTER — LAB (OUTPATIENT)
Dept: LAB | Facility: HOSPITAL | Age: 50
End: 2020-01-26

## 2020-01-26 DIAGNOSIS — Z72.0 TOBACCO ABUSE: ICD-10-CM

## 2020-01-26 DIAGNOSIS — M19.90 CHRONIC ARTHRITIS: ICD-10-CM

## 2020-01-26 PROCEDURE — 84156 ASSAY OF PROTEIN URINE: CPT

## 2020-01-26 PROCEDURE — 84166 PROTEIN E-PHORESIS/URINE/CSF: CPT

## 2020-01-27 LAB
ALBUMIN SERPL-MCNC: 3.9 G/DL (ref 2.9–4.4)
ALBUMIN/GLOB SERPL: 1.3 {RATIO} (ref 0.7–1.7)
ALPHA1 GLOB FLD ELPH-MCNC: 0.3 G/DL (ref 0–0.4)
ALPHA2 GLOB SERPL ELPH-MCNC: 0.8 G/DL (ref 0.4–1)
B-GLOBULIN SERPL ELPH-MCNC: 1 G/DL (ref 0.7–1.3)
GAMMA GLOB SERPL ELPH-MCNC: 0.8 G/DL (ref 0.4–1.8)
GLOBULIN SER CALC-MCNC: 2.9 G/DL (ref 2.2–3.9)
Lab: NORMAL
M-SPIKE: NORMAL G/DL
PROT PATTERN SERPL ELPH-IMP: NORMAL
PROT SERPL-MCNC: 6.8 G/DL (ref 6–8.5)

## 2020-01-28 ENCOUNTER — OFFICE VISIT (OUTPATIENT)
Dept: ORTHOPEDIC SURGERY | Facility: CLINIC | Age: 50
End: 2020-01-28

## 2020-01-28 VITALS — WEIGHT: 164.9 LBS | HEIGHT: 71 IN | OXYGEN SATURATION: 99 % | HEART RATE: 92 BPM | BODY MASS INDEX: 23.09 KG/M2

## 2020-01-28 DIAGNOSIS — M75.111 INCOMPLETE TEAR OF RIGHT ROTATOR CUFF, UNSPECIFIED WHETHER TRAUMATIC: ICD-10-CM

## 2020-01-28 DIAGNOSIS — M54.2 CERVICALGIA: ICD-10-CM

## 2020-01-28 DIAGNOSIS — M75.01 ADHESIVE CAPSULITIS OF RIGHT SHOULDER: ICD-10-CM

## 2020-01-28 DIAGNOSIS — M25.511 RIGHT SHOULDER PAIN, UNSPECIFIED CHRONICITY: Primary | ICD-10-CM

## 2020-01-28 DIAGNOSIS — M19.011 ARTHRITIS OF RIGHT ACROMIOCLAVICULAR JOINT: ICD-10-CM

## 2020-01-28 PROCEDURE — 99213 OFFICE O/P EST LOW 20 MIN: CPT | Performed by: ORTHOPAEDIC SURGERY

## 2020-01-28 PROCEDURE — 20610 DRAIN/INJ JOINT/BURSA W/O US: CPT | Performed by: ORTHOPAEDIC SURGERY

## 2020-01-28 RX ORDER — LIDOCAINE HYDROCHLORIDE 10 MG/ML
3 INJECTION, SOLUTION EPIDURAL; INFILTRATION; INTRACAUDAL; PERINEURAL
Status: COMPLETED | OUTPATIENT
Start: 2020-01-28 | End: 2020-01-28

## 2020-01-28 RX ORDER — TRIAMCINOLONE ACETONIDE 40 MG/ML
40 INJECTION, SUSPENSION INTRA-ARTICULAR; INTRAMUSCULAR
Status: COMPLETED | OUTPATIENT
Start: 2020-01-28 | End: 2020-01-28

## 2020-01-28 RX ADMIN — LIDOCAINE HYDROCHLORIDE 3 ML: 10 INJECTION, SOLUTION EPIDURAL; INFILTRATION; INTRACAUDAL; PERINEURAL at 14:04

## 2020-01-28 RX ADMIN — TRIAMCINOLONE ACETONIDE 40 MG: 40 INJECTION, SUSPENSION INTRA-ARTICULAR; INTRAMUSCULAR at 14:04

## 2020-01-28 NOTE — PROGRESS NOTES
Procedure   Large Joint Arthrocentesis: R glenohumeral  Date/Time: 1/28/2020 2:04 PM  Consent given by: patient  Site marked: site marked  Timeout: Immediately prior to procedure a time out was called to verify the correct patient, procedure, equipment, support staff and site/side marked as required   Supporting Documentation  Indications: pain   Procedure Details  Location: shoulder - R glenohumeral  Preparation: Patient was prepped and draped in the usual sterile fashion  Needle size: 25 G  Approach: anterior  Medications administered: 3 mL lidocaine PF 1% 1 %; 40 mg triamcinolone acetonide 40 MG/ML  Patient tolerance: patient tolerated the procedure well with no immediate complications

## 2020-01-28 NOTE — PROGRESS NOTES
"    [facility] Note        Subjective     CC: Follow-up (Right shoulder pain, unspecified chronicity )      ALBERT Sears is a 49 y.o. male.  Patient is here today after having seen Dr. Frost.  He did not feel like that he needed any surgery on the neck.  He is complaining of pain with the posterior aspect of the scapula.  He says that when he puts pressure there, he has no further pain.  He has pain that radiates down the biceps as well.  He did not get much relief from the AC joint injection given 12/5/2019.      ROS:    Constiutional:Pt denies fever, chills, nausea, or vomiting.  MSK:as above        Objective      Past Medical History  Past Medical History:   Diagnosis Date   • Arthritis    • Collar bone fracture    • GERD (gastroesophageal reflux disease)    • Headache    • Sinusitis          Physical Exam  Pulse 92   Ht 180.3 cm (70.98\")   Wt 74.8 kg (164 lb 14.5 oz)   SpO2 99%   BMI 23.01 kg/m²     Body mass index is 23.01 kg/m².    Patient is well nourished and well developed.        Ortho Exam  Forward elevation 130  Pain with testing of the subscap and 4+ out of 5 strength  Pain with supraspinatus testing and 4+ out of 5 strength  Tender at the AC joint to palpation    Imaging/Labs/EMG Reviewed:  Imaging Results (Last 24 Hours)     ** No results found for the last 24 hours. **          Assessment    Assessment:  1. Right shoulder pain, unspecified chronicity    2. Incomplete tear of right rotator cuff, unspecified whether traumatic    3. Arthritis of right acromioclavicular joint    4. Adhesive capsulitis of right shoulder    5. Cervicalgia        Plan:  1. Recommend over the counter anti-inflammatories for pain and/or swelling  2. Chronic right shoulder pain in a patient with partial-thickness subscapularis tear and adhesive capsulitis and AC joint arthritis--diagnostic therapeutic injection will be given into the right glenohumeral joint.  Follow-up in about 6 to 8 " weeks we will see how is doing overall.  He continues to be in the midst of being worked up by Dr. Frost as well.      Karl Crawford MD  01/28/20  2:11 PM

## 2020-01-29 LAB
ALBUMIN 24H MFR UR ELPH: 50.2 %
ALPHA1 GLOB 24H MFR UR ELPH: 6 %
ALPHA2 GLOB 24H MFR UR ELPH: 13.6 %
B-GLOBULIN MFR UR ELPH: 18.9 %
GAMMA GLOB 24H MFR UR ELPH: 11.3 %
Lab: NORMAL
M PROTEIN MFR UR ELPH: NORMAL %
PROT 24H UR-MRATE: 112 MG/24 HR (ref 30–150)
PROT UR-MCNC: 10.7 MG/DL

## 2020-02-06 ENCOUNTER — HOSPITAL ENCOUNTER (OUTPATIENT)
Dept: MRI IMAGING | Facility: HOSPITAL | Age: 50
Discharge: HOME | End: 2020-02-06

## 2020-02-06 ENCOUNTER — HOSPITAL ENCOUNTER (OUTPATIENT)
Dept: CT IMAGING | Facility: HOSPITAL | Age: 50
Discharge: HOME | End: 2020-02-06
Admitting: NEUROLOGICAL SURGERY

## 2020-02-06 ENCOUNTER — OFFICE VISIT (OUTPATIENT)
Dept: NEUROSURGERY | Facility: CLINIC | Age: 50
End: 2020-02-06

## 2020-02-06 VITALS
DIASTOLIC BLOOD PRESSURE: 80 MMHG | WEIGHT: 162.6 LBS | HEIGHT: 71 IN | SYSTOLIC BLOOD PRESSURE: 130 MMHG | TEMPERATURE: 97.6 F | BODY MASS INDEX: 22.76 KG/M2

## 2020-02-06 DIAGNOSIS — M19.90 CHRONIC ARTHRITIS: ICD-10-CM

## 2020-02-06 DIAGNOSIS — Z72.0 TOBACCO ABUSE: ICD-10-CM

## 2020-02-06 DIAGNOSIS — D18.00 HEMANGIOMA, UNSPECIFIED SITE: Primary | ICD-10-CM

## 2020-02-06 PROCEDURE — 72125 CT NECK SPINE W/O DYE: CPT

## 2020-02-06 PROCEDURE — 82565 ASSAY OF CREATININE: CPT

## 2020-02-06 PROCEDURE — 72156 MRI NECK SPINE W/O & W/DYE: CPT

## 2020-02-06 PROCEDURE — A9577 INJ MULTIHANCE: HCPCS | Performed by: NEUROLOGICAL SURGERY

## 2020-02-06 PROCEDURE — 99213 OFFICE O/P EST LOW 20 MIN: CPT | Performed by: NEUROLOGICAL SURGERY

## 2020-02-06 PROCEDURE — 0 GADOBENATE DIMEGLUMINE 529 MG/ML SOLUTION: Performed by: NEUROLOGICAL SURGERY

## 2020-02-06 RX ADMIN — GADOBENATE DIMEGLUMINE 15 ML: 529 INJECTION, SOLUTION INTRAVENOUS at 12:18

## 2020-02-06 NOTE — PROGRESS NOTES
NAME: SAUL QUIROGA   DOS: 2020  : 1970  PCP: Colby Ferguson PA    Chief Complaint:    Chief Complaint   Patient presents with   • Neck & right arm pain     MRI       History of Present Illness:  49 y.o. male   Follow-up imaging still with persistent right shoulder pain with significant hyper rotation of the neck no evidence of arm pain into the hand most of the pain is worse after swinging a hammer all day at work in his right shoulder    PMHX  Allergies:  No Known Allergies  Medications    Current Outpatient Medications:   •  ibuprofen (ADVIL,MOTRIN) 200 MG tablet, Take 200 mg by mouth Every 6 (Six) Hours As Needed for Mild Pain ., Disp: , Rfl:   No current facility-administered medications for this visit.   Past Medical History:  Past Medical History:   Diagnosis Date   • Arthritis    • Collar bone fracture    • GERD (gastroesophageal reflux disease)    • Headache    • Sinusitis      Past Surgical History:  History reviewed. No pertinent surgical history.  Social Hx:  Social History     Tobacco Use   • Smoking status: Current Every Day Smoker     Packs/day: 2.00     Years: 20.00     Pack years: 40.00     Types: Cigarettes   • Smokeless tobacco: Never Used   Substance Use Topics   • Alcohol use: No   • Drug use: No     Family Hx:  Family History   Problem Relation Age of Onset   • Lung disease Mother    • Cancer Mother    • Heart disease Father      Review of Systems:        Review of Systems   Constitutional: Negative for activity change, appetite change, chills, diaphoresis, fatigue, fever and unexpected weight change.   HENT: Negative for congestion, dental problem, drooling, ear discharge, ear pain, facial swelling, hearing loss, mouth sores, nosebleeds, postnasal drip, rhinorrhea, sinus pressure, sneezing, sore throat, tinnitus, trouble swallowing and voice change.    Eyes: Negative for photophobia, pain, discharge, redness, itching and visual disturbance.   Respiratory: Negative  for apnea, cough, choking, chest tightness, shortness of breath, wheezing and stridor.    Cardiovascular: Negative for chest pain, palpitations and leg swelling.   Gastrointestinal: Negative for abdominal distention, abdominal pain, anal bleeding, blood in stool, constipation, diarrhea, nausea, rectal pain and vomiting.   Endocrine: Negative for cold intolerance, heat intolerance, polydipsia, polyphagia and polyuria.   Genitourinary: Negative for decreased urine volume, difficulty urinating, dysuria, enuresis, flank pain, frequency, genital sores, hematuria and urgency.   Musculoskeletal: Positive for arthralgias, joint swelling, myalgias, neck pain and neck stiffness. Negative for back pain and gait problem.   Skin: Negative for color change, pallor, rash and wound.   Allergic/Immunologic: Negative for environmental allergies, food allergies and immunocompromised state.   Neurological: Positive for weakness and numbness. Negative for dizziness, tremors, seizures, syncope, facial asymmetry, speech difficulty, light-headedness and headaches.   Hematological: Negative for adenopathy. Does not bruise/bleed easily.   Psychiatric/Behavioral: Negative for agitation, behavioral problems, confusion, decreased concentration, dysphoric mood, hallucinations, self-injury, sleep disturbance and suicidal ideas. The patient is not nervous/anxious and is not hyperactive.    All other systems reviewed and are negative.       Negative for red flags of pain    Physical Examination:  Vitals:    02/06/20 1330   BP: 130/80   Temp: 97.6 °F (36.4 °C)      General Appearance:   Well developed, well nourished, well groomed, alert, and cooperative.  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 Sears is a 49 y.o. male   from my standpoint he has run-of-the-mill degenerative changes I do not see anything surgical from my standpoint I would recommend ongoing interventional pain management I like to see him back in 4 to 6 months with a repeat MRI just keep a track of this very unusual looking MRI mid cervical spine hemangioma I do not suspect there is any cancer or other malicious process but it is nonetheless interesting looking    From a neurosurgical standpoint I would absolutely recommend proceeding with what ever types of's shoulder procedures it can be done much of his symptomatology to me seems more mechanical in nature and nonradicular and if there is objective pathology in the shoulder I would recommend addressing that as I do not see any fixes for the cervical spine at the present nature.    I explained the signs and symptoms to look for to necessitate a referral back

## 2020-02-10 LAB — CREAT BLDA-MCNC: 1.1 MG/DL (ref 0.6–1.3)

## 2020-03-10 ENCOUNTER — OFFICE VISIT (OUTPATIENT)
Dept: ORTHOPEDIC SURGERY | Facility: CLINIC | Age: 50
End: 2020-03-10

## 2020-03-10 VITALS — WEIGHT: 167 LBS | HEIGHT: 71 IN | HEART RATE: 97 BPM | OXYGEN SATURATION: 97 % | BODY MASS INDEX: 23.38 KG/M2

## 2020-03-10 DIAGNOSIS — M75.01 ADHESIVE CAPSULITIS OF RIGHT SHOULDER: ICD-10-CM

## 2020-03-10 DIAGNOSIS — M19.011 ARTHRITIS OF RIGHT ACROMIOCLAVICULAR JOINT: ICD-10-CM

## 2020-03-10 DIAGNOSIS — M54.2 CERVICALGIA: ICD-10-CM

## 2020-03-10 DIAGNOSIS — M25.511 RIGHT SHOULDER PAIN, UNSPECIFIED CHRONICITY: Primary | ICD-10-CM

## 2020-03-10 DIAGNOSIS — M75.111 INCOMPLETE TEAR OF RIGHT ROTATOR CUFF, UNSPECIFIED WHETHER TRAUMATIC: ICD-10-CM

## 2020-03-10 PROCEDURE — 99213 OFFICE O/P EST LOW 20 MIN: CPT | Performed by: ORTHOPAEDIC SURGERY

## 2020-03-10 NOTE — PROGRESS NOTES
"    [facility] Note        Subjective     CC: Follow-up of the Right Shoulder (6 week - Last injection 01/28/2020)      HPI    Tyree Sears is a 50 y.o. male.  Patient returns the office today after being injected on 1/28/2020 in the glenohumeral joint.  Initially when he was seen, he was injected in the AC joint on 12/5/2019 and got very little if any relief.  He saw Dr. Frost who did not feel like he needed surgery.  Since his injection in the glenohumeral joint, patient tells me his range of motion has improved, he is rarely taking ibuprofen at most and has had no further numbness and tingling in the right upper extremity.      ROS:    Constiutional:Pt denies fever, chills, nausea, or vomiting.  MSK:as above        Objective      Past Medical History  Past Medical History:   Diagnosis Date   • Arthritis    • Collar bone fracture    • GERD (gastroesophageal reflux disease)    • Headache    • Sinusitis          Physical Exam  Pulse 97   Ht 180.1 cm (70.9\")   Wt 75.8 kg (167 lb)   SpO2 97%   BMI 23.36 kg/m²     Body mass index is 23.36 kg/m².    Patient is well nourished and well developed.        Ortho Exam  Musculoskeletal   Upper Extremity   Right Shoulder     Inspection and Palpation:     Medial border scapular tenderness-none    AC Joint Tenderness -minimal    Sensation is normal    Examination reveals no ecchymosis.        Strength and Tone:    Supraspinatus - 5/5    External Rotators-5/5    Infraspinatus - 5/5    Subscapularis - 5/5    Deltoid - 5/5     Range of Motion      RightShoulder:    Internal Rotation: ROM -L4    External Rotation: AROM -70 degrees    Elevation through flexion: AROM -150 degrees     Abduction: Greater than 90 degrees       Impingement   Right shoulder    Arroyo-Blaine impingement test positive    Neer impingement test positive     Functional Testing   Right shoulder    AC crossover adduction test positive         Imaging/Labs/EMG Reviewed:  Imaging " Results (Last 24 Hours)     ** No results found for the last 24 hours. **          Assessment    Assessment:  1. Right shoulder pain, unspecified chronicity    2. Incomplete tear of right rotator cuff, unspecified whether traumatic    3. Arthritis of right acromioclavicular joint    4. Adhesive capsulitis of right shoulder    5. Cervicalgia        Plan:  1. Recommend over the counter anti-inflammatories for pain and/or swelling  2. Chronic right shoulder pain was cervicalgia and end-stage AC joint arthritis adhesive capsulitis right shoulder--symptoms have completely resolved after glenohumeral joint injection consistent with adhesive capsulitis being his likely pain generator and exacerbation of his cervicalgia and AC joint in a compensatory fashion.  Plan will be for observation for now.  He will continue work on his range of motion.  I do not believe his AC joint needs any type of surgical intervention given his lack of improvement after an injection there and complete resolution of his symptoms with a glenohumeral joint injection.  Follow-up with me ANDREEA Crawford MD  03/10/20  11:59

## 2020-08-21 ENCOUNTER — OFFICE VISIT (OUTPATIENT)
Dept: FAMILY MEDICINE CLINIC | Facility: CLINIC | Age: 50
End: 2020-08-21

## 2020-08-21 VITALS
OXYGEN SATURATION: 99 % | TEMPERATURE: 97.3 F | HEART RATE: 85 BPM | BODY MASS INDEX: 21.81 KG/M2 | DIASTOLIC BLOOD PRESSURE: 92 MMHG | HEIGHT: 71 IN | RESPIRATION RATE: 14 BRPM | SYSTOLIC BLOOD PRESSURE: 146 MMHG | WEIGHT: 155.8 LBS

## 2020-08-21 DIAGNOSIS — F41.1 GAD (GENERALIZED ANXIETY DISORDER): Primary | ICD-10-CM

## 2020-08-21 PROCEDURE — 99213 OFFICE O/P EST LOW 20 MIN: CPT | Performed by: PHYSICIAN ASSISTANT

## 2020-08-21 RX ORDER — ESCITALOPRAM OXALATE 10 MG/1
10 TABLET ORAL DAILY
Qty: 30 TABLET | Refills: 11 | Status: SHIPPED | OUTPATIENT
Start: 2020-08-21 | End: 2020-09-11 | Stop reason: SDUPTHER

## 2020-08-21 NOTE — PROGRESS NOTES
Subjective   Tyree Sears is a 50 y.o. male  Anxiety      History of Present Illness  Patient is a pleasant 50-year-old white male who comes in complaining of new onset of anxiety patient with focus concentration mood swings states feels very anxious throughout the day no fever no chills nausea vomiting states anxiety is worse in the mornings patient has been under more stress last couple months no SI/HI feels anxious all throughout the day under more stress  The following portions of the patient's history were reviewed and updated as appropriate: allergies, current medications, past social history and problem list    Review of Systems   Constitutional: Negative for appetite change, diaphoresis, fatigue and unexpected weight change.   Eyes: Negative for visual disturbance.   Respiratory: Negative for cough, chest tightness and shortness of breath.    Cardiovascular: Negative for chest pain, palpitations and leg swelling.   Gastrointestinal: Negative for diarrhea, nausea and vomiting.   Endocrine: Negative for polydipsia, polyphagia and polyuria.   Skin: Negative for color change and rash.   Neurological: Negative for dizziness, syncope, weakness, light-headedness, numbness and headaches.       Objective     Vitals:    08/21/20 1034   BP: 146/92   Pulse: 85   Resp: 14   Temp: 97.3 °F (36.3 °C)   SpO2: 99%       Physical Exam   Constitutional: He appears well-developed and well-nourished.   Neck: No JVD present.   Cardiovascular: Normal rate, regular rhythm, normal heart sounds, intact distal pulses and normal pulses.   No murmur heard.  Pulmonary/Chest: Effort normal and breath sounds normal. No respiratory distress.   Abdominal: Soft. Bowel sounds are normal. There is no hepatosplenomegaly. There is no tenderness.   Musculoskeletal: He exhibits no edema.   Skin: Skin is warm and dry.   Nursing note and vitals reviewed.      Assessment/Plan     Tyree was seen today for anxiety.    Diagnoses and all orders for  this visit:    ANNA (generalized anxiety disorder)  -     escitalopram (Lexapro) 10 MG tablet; Take 1 tablet by mouth Daily.    Follow-up if no better recheck in 3 weeks

## 2020-09-11 ENCOUNTER — OFFICE VISIT (OUTPATIENT)
Dept: FAMILY MEDICINE CLINIC | Facility: CLINIC | Age: 50
End: 2020-09-11

## 2020-09-11 VITALS
HEART RATE: 75 BPM | HEIGHT: 71 IN | DIASTOLIC BLOOD PRESSURE: 78 MMHG | BODY MASS INDEX: 21.31 KG/M2 | RESPIRATION RATE: 14 BRPM | SYSTOLIC BLOOD PRESSURE: 130 MMHG | TEMPERATURE: 97.3 F | WEIGHT: 152.2 LBS | OXYGEN SATURATION: 98 %

## 2020-09-11 DIAGNOSIS — F41.1 GAD (GENERALIZED ANXIETY DISORDER): ICD-10-CM

## 2020-09-11 PROCEDURE — 99213 OFFICE O/P EST LOW 20 MIN: CPT | Performed by: PHYSICIAN ASSISTANT

## 2020-09-11 RX ORDER — ESCITALOPRAM OXALATE 10 MG/1
10 TABLET ORAL DAILY
Qty: 30 TABLET | Refills: 11 | Status: SHIPPED | OUTPATIENT
Start: 2020-09-11 | End: 2020-09-17 | Stop reason: SDUPTHER

## 2020-09-11 NOTE — PROGRESS NOTES
Subjective   Tyree Sears is a 50 y.o. male  Anxiety (3 wk f/u. Lexapro working well)      History of Present Illness  Patient is a pleasant 50-year-old white male who comes in for follow-up of anxiety started on Lexapro 10 mg every day meds working well no problems plaints no shortness of breath no chest pain meds working well no SI/HI  The following portions of the patient's history were reviewed and updated as appropriate: allergies, current medications, past social history and problem list    Review of Systems   Constitutional: Negative for appetite change, diaphoresis, fatigue and unexpected weight change.   Eyes: Negative for visual disturbance.   Respiratory: Negative for cough, chest tightness and shortness of breath.    Cardiovascular: Negative for chest pain, palpitations and leg swelling.   Gastrointestinal: Negative for diarrhea, nausea and vomiting.   Endocrine: Negative for polydipsia, polyphagia and polyuria.   Skin: Negative for color change and rash.   Neurological: Negative for dizziness, syncope, weakness, light-headedness, numbness and headaches.       Objective     Vitals:    09/11/20 0906   BP: 130/78   Pulse: 75   Resp: 14   Temp: 97.3 °F (36.3 °C)   SpO2: 98%       Physical Exam   Constitutional: He appears well-developed and well-nourished.   Neck: Neck supple. No JVD present. No thyromegaly present.   Cardiovascular: Normal rate, regular rhythm, normal heart sounds, intact distal pulses and normal pulses.   No murmur heard.  Pulmonary/Chest: Effort normal and breath sounds normal. No respiratory distress.   Abdominal: Soft. Bowel sounds are normal. There is no hepatosplenomegaly. There is no tenderness.   Musculoskeletal: He exhibits no edema.   Lymphadenopathy:     He has no cervical adenopathy.   Neurological: No sensory deficit.   Skin: Skin is warm and dry. He is not diaphoretic.   Nursing note and vitals reviewed.      Assessment/Plan     Tyree was seen today for  anxiety.    Diagnoses and all orders for this visit:    ANNA (generalized anxiety disorder)  -     escitalopram (Lexapro) 10 MG tablet; Take 1 tablet by mouth Daily.    #1 Lexapro 10 mg 1 p.o. every day dispense 30×11 refills    Follow-up if no better

## 2020-09-17 DIAGNOSIS — F41.1 GAD (GENERALIZED ANXIETY DISORDER): ICD-10-CM

## 2020-09-17 RX ORDER — ESCITALOPRAM OXALATE 10 MG/1
10 TABLET ORAL DAILY
Qty: 90 TABLET | Refills: 3 | Status: SHIPPED | OUTPATIENT
Start: 2020-09-17 | End: 2021-10-15

## 2020-10-23 ENCOUNTER — HOSPITAL ENCOUNTER (OUTPATIENT)
Dept: GENERAL RADIOLOGY | Facility: HOSPITAL | Age: 50
Discharge: HOME | End: 2020-10-23
Admitting: PHYSICIAN ASSISTANT

## 2020-10-23 ENCOUNTER — OFFICE VISIT (OUTPATIENT)
Dept: FAMILY MEDICINE CLINIC | Facility: CLINIC | Age: 50
End: 2020-10-23

## 2020-10-23 VITALS
BODY MASS INDEX: 21.76 KG/M2 | DIASTOLIC BLOOD PRESSURE: 76 MMHG | HEIGHT: 71 IN | WEIGHT: 155.4 LBS | HEART RATE: 72 BPM | RESPIRATION RATE: 14 BRPM | OXYGEN SATURATION: 98 % | SYSTOLIC BLOOD PRESSURE: 124 MMHG | TEMPERATURE: 98.2 F

## 2020-10-23 DIAGNOSIS — F51.01 PRIMARY INSOMNIA: ICD-10-CM

## 2020-10-23 DIAGNOSIS — R05.9 COUGH: ICD-10-CM

## 2020-10-23 DIAGNOSIS — G89.29 CHRONIC RIGHT SHOULDER PAIN: ICD-10-CM

## 2020-10-23 DIAGNOSIS — R05.9 COUGH: Primary | ICD-10-CM

## 2020-10-23 DIAGNOSIS — M25.511 CHRONIC RIGHT SHOULDER PAIN: ICD-10-CM

## 2020-10-23 PROCEDURE — 71046 X-RAY EXAM CHEST 2 VIEWS: CPT

## 2020-10-23 PROCEDURE — 99214 OFFICE O/P EST MOD 30 MIN: CPT | Performed by: PHYSICIAN ASSISTANT

## 2020-10-23 NOTE — PROGRESS NOTES
Subjective   Tyree Sears is a 50 y.o. male  Anxiety (Lexapro working well) and Insomnia (Diff staying asleep, sleeps apprxo 3-4hrs/night)      History of Present Illness  Patient is a 50-year-old white male who comes in for follow-up of anxiety states meds working much  working well anxiety is controlled he is having trouble sleeping only sleeping 4 to 5 hours per night patient states like to have something help him sleep no shortness breath no chest pain.    A patient complaining of right chronic shoulder pain has had a history of chronic pain in the past states that he was told he had a tear in his right shoulder did not have anything done over the last couple weeks shoulder pain is gotten worse unable to work due to pain in his shoulder cannot sleep on right shoulder cannot put arm above head he states pain is getting worse over-the-counter medication not helping    Patient is complaining of chronic cough patient has history of smoker smoked over 20 years cough is nonproductive chronic in nature has gotten worse over the last 6 months no fever no chills no weight loss chronic tobacco abuse  The following portions of the patient's history were reviewed and updated as appropriate: allergies, current medications, past social history and problem list    Review of Systems   Constitutional: Negative for fatigue and unexpected weight change.   Respiratory: Negative for cough, chest tightness, shortness of breath and wheezing.    Cardiovascular: Negative for chest pain, palpitations and leg swelling.   Gastrointestinal: Negative for nausea.   Musculoskeletal:        Rt shoulder pain   Skin: Negative for color change and rash.   Neurological: Negative for dizziness, syncope, weakness and headaches.       Objective     Vitals:    10/23/20 0917   BP: 124/76   Pulse: 72   Resp: 14   Temp: 98.2 °F (36.8 °C)   SpO2: 98%       Physical Exam  Vitals signs and nursing note reviewed.   Constitutional:       Appearance: He  is well-developed. He is not diaphoretic.   Neck:      Musculoskeletal: Neck supple.      Thyroid: No thyromegaly.      Vascular: No JVD.   Cardiovascular:      Rate and Rhythm: Normal rate and regular rhythm.      Pulses: Normal pulses.      Heart sounds: Normal heart sounds. No murmur.   Pulmonary:      Effort: Pulmonary effort is normal. No respiratory distress.      Breath sounds: Wheezing present.   Abdominal:      General: Bowel sounds are normal.      Palpations: Abdomen is soft.      Tenderness: There is no abdominal tenderness.   Musculoskeletal:      Right shoulder: He exhibits decreased range of motion and tenderness.        Arms:    Lymphadenopathy:      Cervical: No cervical adenopathy.   Skin:     General: Skin is warm and dry.   Neurological:      Sensory: No sensory deficit.         Assessment/Plan     Diagnoses and all orders for this visit:    1. Cough (Primary)  -     Cancel: XR Chest 2 View; Future  -     XR Chest 2 View; Future    2. Chronic right shoulder pain  -     MRI Shoulder Right Without Contrast; Future    3. Primary insomnia  -     Cancel: XR Chest 2 View; Future    #1 check x-ray, patient was encouraged stop smoking    2.  MRI of the right shoulder    3.  Start Restoril 15 mg 1 p.o. nightly dispense 30x3 refills    Time spent with patient 20 minutes discussed treatment plan long-term treatment plan follow-up after 2 weeks

## 2020-11-10 ENCOUNTER — HOSPITAL ENCOUNTER (OUTPATIENT)
Dept: MRI IMAGING | Facility: HOSPITAL | Age: 50
Discharge: HOME | End: 2020-11-10
Admitting: PHYSICIAN ASSISTANT

## 2020-11-10 DIAGNOSIS — M25.511 CHRONIC RIGHT SHOULDER PAIN: ICD-10-CM

## 2020-11-10 DIAGNOSIS — G89.29 CHRONIC RIGHT SHOULDER PAIN: ICD-10-CM

## 2020-11-10 PROCEDURE — 73221 MRI JOINT UPR EXTREM W/O DYE: CPT

## 2020-11-11 ENCOUNTER — TELEPHONE (OUTPATIENT)
Dept: FAMILY MEDICINE CLINIC | Facility: CLINIC | Age: 50
End: 2020-11-11

## 2020-11-11 NOTE — TELEPHONE ENCOUNTER
Patients wife calling to get the results of the chest x-ray   Please call and advise [phone] (S)

## 2020-11-12 ENCOUNTER — TRANSCRIBE ORDERS (OUTPATIENT)
Dept: FAMILY MEDICINE CLINIC | Facility: CLINIC | Age: 50
End: 2020-11-12

## 2020-11-12 DIAGNOSIS — M75.111 INCOMPLETE ROTATOR CUFF TEAR OR RUPTURE OF RIGHT SHOULDER, NOT SPECIFIED AS TRAUMATIC: Primary | ICD-10-CM

## 2020-11-12 NOTE — TELEPHONE ENCOUNTER
Patient was notified of MRI results. He has seen Dr. Crawford for his shoulder within the last year so I advised him to contact their office since he is established instead of waiting for a referral. The referral is still in Epic if needed.

## 2020-11-19 ENCOUNTER — OFFICE VISIT (OUTPATIENT)
Dept: ORTHOPEDIC SURGERY | Facility: CLINIC | Age: 50
End: 2020-11-19

## 2020-11-19 VITALS — BODY MASS INDEX: 21.76 KG/M2 | OXYGEN SATURATION: 98 % | HEART RATE: 75 BPM | WEIGHT: 155.42 LBS | HEIGHT: 71 IN

## 2020-11-19 DIAGNOSIS — M75.111 INCOMPLETE TEAR OF RIGHT ROTATOR CUFF, UNSPECIFIED WHETHER TRAUMATIC: ICD-10-CM

## 2020-11-19 DIAGNOSIS — M75.20 TENDINITIS OF LONG HEAD OF BICEPS: ICD-10-CM

## 2020-11-19 DIAGNOSIS — M25.511 RIGHT SHOULDER PAIN, UNSPECIFIED CHRONICITY: Primary | ICD-10-CM

## 2020-11-19 DIAGNOSIS — M19.011 ARTHRITIS OF RIGHT ACROMIOCLAVICULAR JOINT: ICD-10-CM

## 2020-11-19 PROCEDURE — 99214 OFFICE O/P EST MOD 30 MIN: CPT | Performed by: ORTHOPAEDIC SURGERY

## 2020-11-19 RX ORDER — TEMAZEPAM 15 MG/1
15 CAPSULE ORAL
COMMUNITY
Start: 2020-10-23 | End: 2022-06-02

## 2020-11-19 NOTE — PROGRESS NOTES
"    [facility]        Subjective     CC: Follow-up (8 months follow up for Right shoulder pain)      HPI    Tyree Sears is a 50 y.o. male.  Patient returns to the office today for follow-up of his right shoulder.  He last saw me in March 2020.  He was initially injected in the AC joint got little to no relief from that injection.  His first MRI showed high-grade partial tear of the upper subscap and patient was also quite stiff and he was injected in the glenohumeral joint got great relief from that injection.  He episodically has complained of numbness and tingling in the right upper extremity.  Cervical MRI was relatively clean overall.  Patient tells me that he now has pain that radiates up and down the biceps.  He has difficulty after long day at work.  He has difficulty reaching across his body.  He has some anterolateral pain as well.  He is here for consideration of surgery.    Overall, patient's symptoms are worsening.    ROS:    Constiutional:Pt denies fever, chills, nausea, or vomiting.  MSK:as above        Objective      Past Medical History  Past Medical History:   Diagnosis Date   • Arthritis    • Collar bone fracture    • GERD (gastroesophageal reflux disease)    • Headache    • Sinusitis          Physical Exam  Pulse 75   Ht 180.1 cm (70.91\")   Wt 70.5 kg (155 lb 6.8 oz)   SpO2 98%   BMI 21.73 kg/m²     Body mass index is 21.73 kg/m².    Patient is well nourished and well developed.        Ortho Exam  Musculoskeletal   Upper Extremity   Right Shoulder     Inspection and Palpation:     Medial border scapular tenderness-none    AC Joint Tenderness -moderate    Sensation is normal    Examination reveals no ecchymosis.        Strength and Tone:    Supraspinatus -4 out of 5 with pain    External Rotators-5/5 with pain    Infraspinatus - 5/5    Subscapularis - 5/5 with pain    Deltoid - 5/5     Range of Motion      RightShoulder:    Internal Rotation: ROM - " L4    External Rotation: AROM - 60 degrees    Elevation through flexion: AROM - 140 degrees        Impingement   Right shoulder    Arroyo-Blaine impingement test mildly positive    Neer impingement test positive     Functional Testing   Right shoulder    AC crossover adduction test positive    Speeds test positive    Uppercut test negative    O'Briens test negative    Drop arm sign negative    Apprehension relocation negative      Imaging/Labs/EMG Reviewed:  Imaging Results (Last 24 Hours)     ** No results found for the last 24 hours. **      MRI Shoulder Right Without Contrast  Narrative: EXAMINATION: MRI SHOULDER RIGHT WO CONTRAST- 11/10/2020     INDICATION: Shoulder pain, labral tear suspected, nondiagnostic xray;  M25.511-Pain in right shoulder; G89.29-Other chronic pain     TECHNIQUE: Axial proton density, sagittal T1 and STIR, coronal T1 and T2  fat-sat images of the right shoulder.     COMPARISON: Right shoulder MRI 11/12/2019     FINDINGS: Patient history indicates right shoulder pain, clinical exam  consistent with incomplete tear of the rotator cuff. Adhesive  capsulitis. Also right AC joint arthritis. Previous exam report from  11/12/2019 indicates significant partial thickness tear and tendinopathy  of the subscapularis. Undercut appearance of the superior labrum.     Today's study shows increasing, now extensive irregularity of the mid  supraspinatus tendon, with somewhat discordant appearance between the  coronal images where it appears much more extensive, than on the  sagittal images. In particular, the bursal surface appears highly  irregular. Articular surface appear smooth margined. There appears to be  a separate focal partial thickness tear at the distal insertion. Small  subdeltoid bursal effusion and extensive AC joint arthropathy are also  noted. There is thickened subscapularis tendon with increased internal  signal, and a suggestion of partial thickness tear on sagittal STIR  image 17  series 7. Infraspinatus and teres minor tendons appear intact  and normal.     Long head biceps tendon at the level of the intertubercular groove  appears normal. Biceps anchor is not ideally visualized, but appears  intact. Heterogeneous appearance of the superior labrum is similar to  the prior study. Anterior labrum appears increasingly abnormal, now  associated with a small paralabral cyst 4 mm in diameter. Adjacent  anterior-inferior labrum appears irregular. There is focal thinning of  the articular cartilage here. Posterior portion of the inferior labrum,  appears intact. Posterior labrum is irregular and associated with a  paralabral cyst, 8 mm in diameter, image 14 series 4, and axial image 28  series 3.      There is a small degenerative bone cyst of the glenoid, and moderate AC  joint arthropathy. No significant marrow edema is seen. Sagittal images  show no evidence of rotator cuff muscle atrophy. Axillary pouch appears  very small and irregular, which would support clinical findings of  adhesive capsulitis.           Impression: 1. Increasingly abnormal appearance of the supraspinatus tendon, with  extensive bursal surface irregularity, and focal distal partial  thickness tear.  Partial thickness subscapularis tear.      2. Heterogeneous, irregular appearance of the superior labrum a little  increased from prior exam, whether increased labral degeneration or  branching tear.  Probable anterior and posterior labral tears, both  associated with paralabral cysts.     3. Articular cartilage thinning, mostly of the anterior articular  surface the glenoid.     4. Irregular and contracted appearance of the axillary pouch, which  would be supportive of adhesive capsulitis.     D:  11/11/2020  E:  11/11/2020     This report was finalized on 11/15/2020 8:52 AM by Dr. Diogenes Rhodes MD.       Reviewed report and images of the MRI of the patient's right shoulder from 11/10/2020.  We compared this to the MRI from  11/12/2019.  The appearance of the upper subscapularis is still slightly irregular but not nearly as much on this most recent MRI.  There does appear to be a 50% tear of the bursal side of the supraspinatus just posterior to the biceps tendon.  No significant amount of fluid around the long head the biceps.  There is quite a bit of edema in the AC joint.    Assessment    Assessment:  1. Right shoulder pain, unspecified chronicity    2. Incomplete tear of right rotator cuff, unspecified whether traumatic    3. Arthritis of right acromioclavicular joint    4. Tendinitis of long head of biceps        Plan:  1. Recommend over the counter anti-inflammatories for pain and/or swelling  2. Partial-thickness rotator cuff tear upper subscapularis--patient has some irritability on exam today.  He has tendinitis of the long head of the biceps tendon as well.  No dramatic amount of fluid on MRI.  He has irritability.  He is tender over the long head of the biceps to palpation today.  I suspect that these 2 are related.  3. Partial-thickness bursal sided tear of the supraspinatus--new finding compared to the old MRI.  Patient does have some supraspinatus irritability today.  4. AC joint arthritis--more irritable today.  Positive crepitance.  Tender to palpation there today.  5. Patient is seeking definitive treatment for his issues.  I told him there is no single smoking gun to explain why he is having such symptoms.  The majority of the symptoms are in the biceps tendon.  I suspect that is why he got great relief from the prior glenohumeral joint injection.  The planned procedure will be as follows: Diagnostic arthroscopy of the right shoulder with close assessment of the upper subscap.  If it appears partially torn, it will be repaired.  We will then tenodesis the long head of the biceps low within the bicipital groove.  We will assess the bursal sided supraspinatus tear and if it appears to be significant, this will be taken  down and repaired primarily.  An open distal clavicle resection will then be performed.  The risk, benefits, potential hazards were discussed at length with him this afternoon.  We told him that in the absence of objective evidence of pathology during the diagnostic arthroscopy, we would simply tenodesis the biceps and performed a distal clavicle resection.  Hopefully we see a better explanation of why he is having the symptoms he is having.  I suspect we will.  Patient will go to [facility] today for scheduling.      Karl Crawford MD  11/19/20  17:38 MICHELINE Weller disclaimer:  Much of this encounter note is an electronic transcription/translation of spoken language to printed text. The electronic translation of spoken language may permit erroneous, or at times, nonsensical words or phrases to be inadvertently transcribed; Although I have reviewed the note for such errors, some may still exist.

## 2021-01-18 ENCOUNTER — TELEPHONE (OUTPATIENT)
Dept: ORTHOPEDIC SURGERY | Facility: CLINIC | Age: 51
End: 2021-01-18

## 2021-01-19 ENCOUNTER — APPOINTMENT (OUTPATIENT)
Dept: PREADMISSION TESTING | Facility: HOSPITAL | Age: 51
End: 2021-01-19

## 2021-01-19 PROCEDURE — C9803 HOPD COVID-19 SPEC COLLECT: HCPCS

## 2021-01-19 PROCEDURE — U0004 COV-19 TEST NON-CDC HGH THRU: HCPCS

## 2021-01-20 LAB — SARS-COV-2 RNA RESP QL NAA+PROBE: NOT DETECTED

## 2021-01-22 ENCOUNTER — OUTSIDE FACILITY SERVICE (OUTPATIENT)
Dept: ORTHOPEDIC SURGERY | Facility: CLINIC | Age: 51
End: 2021-01-22

## 2021-01-22 DIAGNOSIS — M25.511 RIGHT SHOULDER PAIN, UNSPECIFIED CHRONICITY: Primary | ICD-10-CM

## 2021-01-22 DIAGNOSIS — Z98.890 S/P ARTHROSCOPY OF SHOULDER: ICD-10-CM

## 2021-01-22 PROCEDURE — 23120 CLAVICULECTOMY PARTIAL: CPT | Performed by: ORTHOPAEDIC SURGERY

## 2021-01-22 PROCEDURE — 29823 SHO ARTHRS SRG XTNSV DBRDMT: CPT | Performed by: ORTHOPAEDIC SURGERY

## 2021-01-22 PROCEDURE — 29828 SHO ARTHRS SRG BICP TENODSIS: CPT | Performed by: ORTHOPAEDIC SURGERY

## 2021-01-22 RX ORDER — OXYCODONE HYDROCHLORIDE AND ACETAMINOPHEN 5; 325 MG/1; MG/1
1 TABLET ORAL EVERY 6 HOURS PRN
Qty: 30 TABLET | Refills: 0 | Status: SHIPPED | OUTPATIENT
Start: 2021-01-22 | End: 2021-01-28 | Stop reason: SDUPTHER

## 2021-01-28 ENCOUNTER — TELEPHONE (OUTPATIENT)
Dept: ORTHOPEDIC SURGERY | Facility: CLINIC | Age: 51
End: 2021-01-28

## 2021-01-28 DIAGNOSIS — Z98.890 S/P ARTHROSCOPY OF SHOULDER: Primary | ICD-10-CM

## 2021-01-28 RX ORDER — OXYCODONE HYDROCHLORIDE AND ACETAMINOPHEN 5; 325 MG/1; MG/1
1 TABLET ORAL EVERY 6 HOURS PRN
Qty: 30 TABLET | Refills: 0 | Status: SHIPPED | OUTPATIENT
Start: 2021-01-28 | End: 2021-02-09

## 2021-01-28 NOTE — TELEPHONE ENCOUNTER
PATIENT SPOUSE SHANNON CALLED REQUESTING REFILL ON OXYCODONE OR SOMETHING ELSE TO HELP PATIENT SLEEP. PATIENTS SPOUSE STATED AT NIGHT THE PAIN SEEMS TO BE WORSE. PATIENTS SPOUSE WOULD LIKE A CALL BACK [phone].

## 2021-02-09 ENCOUNTER — OFFICE VISIT (OUTPATIENT)
Dept: ORTHOPEDIC SURGERY | Facility: CLINIC | Age: 51
End: 2021-02-09

## 2021-02-09 DIAGNOSIS — Z98.890 S/P ARTHROSCOPY OF SHOULDER: Primary | ICD-10-CM

## 2021-02-09 DIAGNOSIS — M75.20 TENDINITIS OF LONG HEAD OF BICEPS: ICD-10-CM

## 2021-02-09 DIAGNOSIS — M19.011 ARTHRITIS OF RIGHT ACROMIOCLAVICULAR JOINT: ICD-10-CM

## 2021-02-09 PROCEDURE — 99024 POSTOP FOLLOW-UP VISIT: CPT | Performed by: PHYSICIAN ASSISTANT

## 2021-02-09 NOTE — PROGRESS NOTES
[facility]        Subjective     Post-op (2 weeks status post Right shoulder arthroscopy. arthroscopic biceps tendosis, open distal clavicle resection and major debridement of superior labrum, glenoid and subacromial bursa 1-22-21)       ALBERT    Tyree Sears is a 50 y.o. male.  Patient presents for their initial postop visit following right shoulder arthroscopy with arthroscopic bicep tenodesis, debridement of the labrum, open DCE performed on the above date by Dr. Crawford.    Patient currently endorses a pain scale maximum of 8/10.  He is wearing his postoperative sling as directed.  No reported numbness or tingling into the extremity.    Denies any fever, chills, night sweats or other constitutional symptoms.        Objective      Physical Exam  There were no vitals taken for this visit.    There is no height or weight on file to calculate BMI.        Ortho Exam  Peripheral Vascular   Right Upper Extremity    No cyanotic nail beds    Pink nail beds and rapid capillary refill   Palpation    Radial Pulse - Bilaterally normal    Musculoskeletal   Upper Extremity   Right Shoulder    Inspection and palpation:    Tenderness -mild to moderate    Swelling -mild with resolving ecchymosis noted to the arm    Sensation - normal    Incision-healing appropriately, no drainage  Motor: Grossly intact axillary, MSC, radial, ulnar, median, AIN, PIN.  Sensory: Grossly intact to light touch axillary, MSC, radial, ulnar, median nerve distributions.  Vascular: 2+ radial pulse with brisk capillary refill noted and each digit.    No evidence of Rob deformity      Imaging Reviewed:  No new imaging today.      Assessment:  1. S/P arthroscopy of shoulder    2. Tendinitis of long head of biceps    3. Arthritis of right acromioclavicular joint        Plan:  1. Status post right shoulder arthroscopy with arthroscopic bicep tenodesis, open DCE and debridement of labrum--stable.  2. Sutures were removed  today.  3. Patient was taught home shoulder exercises.  4. Also ordered formal PT ([facility])--patient was provided bicep tenodesis protocol, which he will give to the physical therapist.  5. Absolutely no active range of motion, elbow flexion.  Passive motion only.  6. Recommend over-the-counter pain medications as needed.  7. Continue use of sling for an additional 1-1/2 weeks.  Once he is 4 weeks out from time of surgery he may transition out of the sling.  The abdominal portion of the sling was removed today.  8. Follow-up in 4 weeks for repeat evaluation with Dr. Crawford.  9. Questions and concerns answered.      Christie Arias PA-C  02/11/21  09:15 EST      Dragon disclaimer:  Much of this encounter note is an electronic transcription/translation of spoken language to printed text. The electronic translation of spoken language may permit erroneous, or at times, nonsensical words or phrases to be inadvertently transcribed; Although I have reviewed the note for such errors, some may still exist.

## 2021-02-10 ENCOUNTER — TELEMEDICINE (OUTPATIENT)
Dept: FAMILY MEDICINE CLINIC | Facility: CLINIC | Age: 51
End: 2021-02-10

## 2021-02-10 DIAGNOSIS — Z12.11 SCREEN FOR COLON CANCER: ICD-10-CM

## 2021-02-10 DIAGNOSIS — K64.8 INTERNAL HEMORRHOIDS: Primary | ICD-10-CM

## 2021-02-10 PROCEDURE — 99213 OFFICE O/P EST LOW 20 MIN: CPT | Performed by: PHYSICIAN ASSISTANT

## 2021-02-10 RX ORDER — HYDROCORTISONE ACETATE 25 MG/1
25 SUPPOSITORY RECTAL 2 TIMES DAILY
Qty: 12 SUPPOSITORY | Refills: 1 | Status: SHIPPED | OUTPATIENT
Start: 2021-02-10 | End: 2022-06-02

## 2021-02-10 NOTE — PROGRESS NOTES
Subjective   Tyree Sears is a 50 y.o. male  Rectal Bleeding  Video visit  Patient consents for blood in stool    History of Present Illness  Patient is a pleasant 50-year-old white male who presents for blood in stool bright red rectal bleeding.  Patient states since his shoulder surgery start taking pain medication had some hard bowel movements and had some rectal bleeding bright red off and on with hard bowel movements present x2 weeks  The following portions of the patient's history were reviewed and updated as appropriate: allergies, current medications, past social history and problem list    Review of Systems   Constitutional: Negative for fatigue and unexpected weight change.   Respiratory: Negative for cough, chest tightness and shortness of breath.    Cardiovascular: Negative for chest pain, palpitations and leg swelling.   Gastrointestinal: Negative for nausea.        Blood in stool   Skin: Negative for color change and rash.   Neurological: Negative for dizziness, syncope, weakness and headaches.       Objective     There were no vitals filed for this visit.    Physical Exam  Constitutional:       Appearance: Normal appearance. He is normal weight.   Neurological:      Mental Status: He is alert.   Psychiatric:         Mood and Affect: Mood normal.         Behavior: Behavior normal.         Thought Content: Thought content normal.         Judgment: Judgment normal.         Assessment/Plan     Diagnoses and all orders for this visit:    1. Internal hemorrhoids (Primary)  -     hydrocortisone (ANUSOL-HC) 25 MG suppository; Insert 1 suppository into the rectum 2 (Two) Times a Day.  Dispense: 12 suppository; Refill: 1    2. Screen for colon cancer  -     Amb referral for Screening Colonoscopy    Time spent with patient 15 minutes discussed treatment plan long-term treatment plan discussed ways to improve symptoms of rectal bleeding increase fiber in diet stool softener

## 2021-03-09 ENCOUNTER — OFFICE VISIT (OUTPATIENT)
Dept: ORTHOPEDIC SURGERY | Facility: CLINIC | Age: 51
End: 2021-03-09

## 2021-03-09 DIAGNOSIS — Z98.890 S/P ARTHROSCOPY OF SHOULDER: Primary | ICD-10-CM

## 2021-03-09 DIAGNOSIS — M75.20 TENDINITIS OF LONG HEAD OF BICEPS: ICD-10-CM

## 2021-03-09 PROCEDURE — 99024 POSTOP FOLLOW-UP VISIT: CPT | Performed by: ORTHOPAEDIC SURGERY

## 2021-03-09 NOTE — PROGRESS NOTES
[facility] Note        Subjective     Post-op (4 week follow up; 6.5 weeks status post Right shoulder arthroscopy. arthroscopic biceps tendosis, open distal clavicle resection and major debridement of superior labrum, glenoid and subacromial bursa 1-22-21)       ALBERT    Tyree Sears is a 51 y.o. male.  Patient is now 6 weeks out from right shoulder arthroscopy with arthroscopic biceps tenodesis and open distal clavicle resection.  He is doing well overall.  He says he woke up 1 morning and noticed asymmetry in his right arm.  He is having minimal pain.  He has not started physical therapy yet.  He is doing his physical therapy at [facility].          Objective      Physical Exam  There were no vitals taken for this visit.    There is no height or weight on file to calculate BMI.        Ortho Exam  Peripheral Vascular   Right Upper Extremity    No cyanotic nail beds    Pink nail beds and rapid capillary refill   Palpation    Radial Pulse - Bilaterally normal    Musculoskeletal   Upper Extremity   Right Shoulder    Inspection and palpation:    Tenderness - mild    Swelling - none    Sensation - normal    Incision-healing appropriately, no drainage   ROJM:   Right:   External Rotation: PROM - 30 degrees   Elevation through flexion: PROM - 100 degrees  Rob sign present    Imaging Reviewed:  Imaging Results (Last 24 Hours)     ** No results found for the last 24 hours. **            Assessment    Assessment:  1. S/P arthroscopy of shoulder    2. Tendinitis of long head of biceps        Plan:  1. Status post right shoulder arthroscopy with open distal clavicle resection and arthroscopic biceps tenodesis--patient currently has a Rob sign which tells me that the patient has pulled out the long head of the biceps from his docking site.  We discussed the options at this point we have agreed to let this fly and that the deformity should lessen over time and the biceps spasm should  also diminished as well.  I have encouraged him to be active with therapy.  We will let him go back to work tomorrow.  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  I will see him back in about 4 to 6 weeks and reassess range of motion.      Karl Crawford MD  03/09/21  18:37 MICHELINE Weller disclaimer:  Much of this encounter note is an electronic transcription/translation of spoken language to printed text. The electronic translation of spoken language may permit erroneous, or at times, nonsensical words or phrases to be inadvertently transcribed; Although I have reviewed the note for such errors, some may still exist.

## 2021-04-20 ENCOUNTER — OFFICE VISIT (OUTPATIENT)
Dept: ORTHOPEDIC SURGERY | Facility: CLINIC | Age: 51
End: 2021-04-20

## 2021-04-20 DIAGNOSIS — Z98.890 S/P ARTHROSCOPY OF SHOULDER: Primary | ICD-10-CM

## 2021-04-20 DIAGNOSIS — M75.20 TENDINITIS OF LONG HEAD OF BICEPS: ICD-10-CM

## 2021-04-20 PROCEDURE — 99024 POSTOP FOLLOW-UP VISIT: CPT | Performed by: ORTHOPAEDIC SURGERY

## 2021-04-20 RX ORDER — MELOXICAM 15 MG/1
TABLET ORAL
Qty: 30 TABLET | Refills: 1 | Status: SHIPPED | OUTPATIENT
Start: 2021-04-20 | End: 2022-06-02

## 2021-04-20 NOTE — PROGRESS NOTES
[facility]        Subjective     Post-op Follow-up (6 week follow up; 12 weeks status post Right shoulder arthroscopy. arthroscopic biceps tendosis, open distal clavicle resection and major debridement of superior labrum, glenoid and subacromial bursa 1-22-21)       ALBERT    Tyree Sears is a 51 y.o. male.  Patient returns the office today now 3 months out from right shoulder arthroscopy with arthroscopic biceps tenodesis and open distal clavicle resection.  Patient is doing well overall.  Had little bit of spasm and pain in the long of the biceps which likely has pulled out of his docking site.          Objective      Physical Exam  There were no vitals taken for this visit.    There is no height or weight on file to calculate BMI.        Ortho Exam  Musculoskeletal   Upper Extremity   Right Shoulder     Tenderness to palpation over the biceps muscle belly    Strength and Tone:    Supraspinatus -5 out of 5    External Rotators-5/5    Infraspinatus - 5/5    Subscapularis - 5/5    Deltoid - 5/5     Range of Motion      Right Shoulder:    Internal Rotation: ROM - L4    External Rotation: AROM - 70 degrees    Elevation through flexion: AROM - 140 degrees     AC joint:  non tender to palpation    AC joint:  negative crossover      Imaging Reviewed:  Imaging Results (Last 24 Hours)     ** No results found for the last 24 hours. **            Assessment    Assessment:  1. S/P arthroscopy of shoulder    2. Tendinitis of long head of biceps        Plan:  1. Status post arthroscopy of the right shoulder with distal clavicle resection and arthroscopic biceps tenodesis with subsequent biceps rupture--patient will call us back if we see something further with the long of the biceps tendon.  At this point, it could debride the tendinous edge which seems to be a little bit tender but overall I do recommend observing it for now and this should get better with time.  Follow-up as needed      Karl  Grzegorz Crawford MD  04/20/21  17:31 EDT      Dragon disclaimer:  Much of this encounter note is an electronic transcription/translation of spoken language to printed text. The electronic translation of spoken language may permit erroneous, or at times, nonsensical words or phrases to be inadvertently transcribed; Although I have reviewed the note for such errors, some may still exist.

## 2021-10-15 DIAGNOSIS — F41.1 GAD (GENERALIZED ANXIETY DISORDER): ICD-10-CM

## 2021-10-15 RX ORDER — ESCITALOPRAM OXALATE 10 MG/1
TABLET ORAL
Qty: 90 TABLET | Refills: 1 | Status: SHIPPED | OUTPATIENT
Start: 2021-10-15 | End: 2022-06-02

## 2021-11-23 ENCOUNTER — TELEPHONE (OUTPATIENT)
Dept: FAMILY MEDICINE CLINIC | Facility: CLINIC | Age: 51
End: 2021-11-23

## 2021-11-23 DIAGNOSIS — Z12.11 COLON CANCER SCREENING: Primary | ICD-10-CM

## 2021-11-23 NOTE — TELEPHONE ENCOUNTER
Colonoscopy ordered, let patient know that I have nothing to do with who calls to schedule appointment and goes to a central scheduling hub that does that, we will make a note in the chart but that is all we can do.  If she does not hear back from them in a week or so let us know

## 2021-11-23 NOTE — TELEPHONE ENCOUNTER
Colonoscopy ordered in Feb but cancelled d/t COVID.    Wife said he is having rectal bleeding, blood in stool, and has hemorrhoids.     She would like to be contacted with appointment info because she handles all of his appointments.    [phone]

## 2021-11-23 NOTE — TELEPHONE ENCOUNTER
Caller: Susana Sears    Relationship: Emergency Contact    Best call back number: [phone]    What is the best time to reach you: ANYTIME    Who are you requesting to speak with (clinical staff, provider,  specific staff member): CLINICAL STAFF        What was the call regarding: THE PATIENTS WIFE WOULD LIKE TO GET THE PATIENT SCHEDULED FOR A COLONOSCOPY IF POSSIBLE PLEASE CALL PATIENT WIFE TO DISCUSS    Do you require a callback: YES

## 2022-01-26 RX ORDER — SODIUM, POTASSIUM,MAG SULFATES 17.5-3.13G
2 SOLUTION, RECONSTITUTED, ORAL ORAL TAKE AS DIRECTED
Qty: 354 ML | Refills: 0 | Status: SHIPPED | OUTPATIENT
Start: 2022-01-26 | End: 2022-06-02

## 2022-02-01 ENCOUNTER — OUTSIDE FACILITY SERVICE (OUTPATIENT)
Dept: GASTROENTEROLOGY | Facility: CLINIC | Age: 52
End: 2022-02-01

## 2022-02-01 PROCEDURE — 45385 COLONOSCOPY W/LESION REMOVAL: CPT | Performed by: INTERNAL MEDICINE

## 2022-02-01 PROCEDURE — 45380 COLONOSCOPY AND BIOPSY: CPT | Performed by: INTERNAL MEDICINE

## 2022-02-01 PROCEDURE — 88305 TISSUE EXAM BY PATHOLOGIST: CPT | Performed by: INTERNAL MEDICINE

## 2022-02-02 ENCOUNTER — LAB REQUISITION (OUTPATIENT)
Dept: LAB | Facility: HOSPITAL | Age: 52
End: 2022-02-02

## 2022-02-02 DIAGNOSIS — D12.5 BENIGN NEOPLASM OF SIGMOID COLON: ICD-10-CM

## 2022-02-02 DIAGNOSIS — K57.30 DIVERTICULOSIS OF LARGE INTESTINE WITHOUT PERFORATION OR ABSCESS WITHOUT BLEEDING: ICD-10-CM

## 2022-02-02 DIAGNOSIS — Z12.11 ENCOUNTER FOR SCREENING FOR MALIGNANT NEOPLASM OF COLON: ICD-10-CM

## 2022-02-02 DIAGNOSIS — K52.9 NONINFECTIVE GASTROENTERITIS AND COLITIS, UNSPECIFIED: ICD-10-CM

## 2022-02-02 DIAGNOSIS — K64.8 OTHER HEMORRHOIDS: ICD-10-CM

## 2022-02-03 LAB
CYTO UR: NORMAL
LAB AP CASE REPORT: NORMAL
LAB AP CLINICAL INFORMATION: NORMAL
PATH REPORT.FINAL DX SPEC: NORMAL
PATH REPORT.GROSS SPEC: NORMAL

## 2022-06-02 ENCOUNTER — OFFICE VISIT (OUTPATIENT)
Dept: FAMILY MEDICINE CLINIC | Facility: CLINIC | Age: 52
End: 2022-06-02

## 2022-06-02 VITALS
BODY MASS INDEX: 24.82 KG/M2 | OXYGEN SATURATION: 98 % | HEART RATE: 75 BPM | SYSTOLIC BLOOD PRESSURE: 148 MMHG | WEIGHT: 173.4 LBS | TEMPERATURE: 97 F | DIASTOLIC BLOOD PRESSURE: 90 MMHG | RESPIRATION RATE: 14 BRPM | HEIGHT: 70 IN

## 2022-06-02 DIAGNOSIS — F17.210 CIGARETTE NICOTINE DEPENDENCE WITHOUT COMPLICATION: ICD-10-CM

## 2022-06-02 DIAGNOSIS — K64.8 INTERNAL HEMORRHOIDS: ICD-10-CM

## 2022-06-02 DIAGNOSIS — Z72.0 TOBACCO USE: ICD-10-CM

## 2022-06-02 DIAGNOSIS — R91.1 PULMONARY NODULE: Primary | ICD-10-CM

## 2022-06-02 PROCEDURE — 99214 OFFICE O/P EST MOD 30 MIN: CPT | Performed by: PHYSICIAN ASSISTANT

## 2022-06-02 RX ORDER — VARENICLINE TARTRATE 1 MG/1
1 TABLET, FILM COATED ORAL 2 TIMES DAILY
Qty: 60 TABLET | Refills: 3 | Status: SHIPPED | OUTPATIENT
Start: 2022-06-02 | End: 2022-09-14 | Stop reason: SDUPTHER
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
## 2022-06-03 ENCOUNTER — TRANSCRIBE ORDERS (OUTPATIENT)
Dept: FAMILY MEDICINE CLINIC | Facility: CLINIC | Age: 52
End: 2022-06-03

## 2022-06-03 ENCOUNTER — TELEPHONE (OUTPATIENT)
Dept: FAMILY MEDICINE CLINIC | Facility: CLINIC | Age: 52
End: 2022-06-03

## 2022-06-03 DIAGNOSIS — R91.1 LUNG NODULE: Primary | ICD-10-CM

## 2022-06-03 NOTE — TELEPHONE ENCOUNTER
Caller: Ssuana Sears    Relationship: Emergency Contact    Best call back number: [phone]    What medication are you requesting: SOMETHING FOR NERVES    What are your current symptoms:BRIZUELA-IRRITABLE    How long have you been experiencing symptoms: 1 DAY    Have you had these symptoms before:    [] Yes  [x] No    Have you been treated for these symptoms before:   [] Yes  [x] No    If a prescription is needed, what is your preferred pharmacy and phone number: [id number] - [address] [phone] [facility] [phone]      Additional notes:SPOUSE CALLED IN STATED  STOPPED SMOKING ON 06/02/22 SINCE THAT TIME IS VERY BRIZUELA AND IRRITABLE SHE IS REQUESTING A MEDICATION FOR HIS ANXIETY

## 2022-06-21 ENCOUNTER — HOSPITAL ENCOUNTER (OUTPATIENT)
Dept: CT IMAGING | Facility: HOSPITAL | Age: 52
Discharge: HOME | End: 2022-06-21
Admitting: PHYSICIAN ASSISTANT

## 2022-06-21 DIAGNOSIS — R91.1 LUNG NODULE: ICD-10-CM

## 2022-06-21 PROCEDURE — 71250 CT THORAX DX C-: CPT

## 2022-06-22 DIAGNOSIS — R91.1 PULMONARY NODULE: Primary | ICD-10-CM

## 2022-06-24 ENCOUNTER — TELEPHONE (OUTPATIENT)
Dept: FAMILY MEDICINE CLINIC | Facility: CLINIC | Age: 52
End: 2022-06-24

## 2022-06-24 NOTE — TELEPHONE ENCOUNTER
Caller: Susana Sears    Relationship: Emergency Contact    Best call back number: [phone]    Caller requesting test results: YES    What test was performed: CT SCAN    When was the test performed: LAST WEEK    Where was the test performed: [facility]    Additional notes:

## 2022-06-24 NOTE — TELEPHONE ENCOUNTER
Let patient know that CT scan showed pulmonary nodules and chest, no acute disease found they recommend follow-up CT scan in 6 months, I went ahead and made referral to pulmonology for the pulmonary nodule clinic so they can follow these nodules.  Let patient know nothing be concerned  about this is just routine follow-up for nodules

## 2022-07-29 ENCOUNTER — OFFICE VISIT (OUTPATIENT)
Dept: PULMONOLOGY | Facility: CLINIC | Age: 52
End: 2022-07-29

## 2022-07-29 VITALS
OXYGEN SATURATION: 97 % | HEIGHT: 70 IN | SYSTOLIC BLOOD PRESSURE: 150 MMHG | WEIGHT: 184 LBS | BODY MASS INDEX: 26.34 KG/M2 | DIASTOLIC BLOOD PRESSURE: 90 MMHG | TEMPERATURE: 98 F | HEART RATE: 74 BPM

## 2022-07-29 DIAGNOSIS — R91.1 PULMONARY NODULE: Primary | ICD-10-CM

## 2022-07-29 DIAGNOSIS — Z87.891 HISTORY OF TOBACCO ABUSE: ICD-10-CM

## 2022-07-29 PROCEDURE — 99204 OFFICE O/P NEW MOD 45 MIN: CPT | Performed by: INTERNAL MEDICINE

## 2022-07-29 RX ORDER — HYDROCORTISONE 25 MG/G
CREAM TOPICAL
COMMUNITY
Start: 2022-07-12 | End: 2022-10-28
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
## 2022-09-14 DIAGNOSIS — Z72.0 TOBACCO USE: ICD-10-CM

## 2022-09-14 DIAGNOSIS — F17.210 CIGARETTE NICOTINE DEPENDENCE WITHOUT COMPLICATION: ICD-10-CM

## 2022-09-14 RX ORDER — VARENICLINE TARTRATE 1 MG/1
1 TABLET, FILM COATED ORAL 2 TIMES DAILY
Qty: 60 TABLET | Refills: 3 | Status: SHIPPED | OUTPATIENT
Start: 2022-09-14 | End: 2022-10-28

## 2022-09-14 NOTE — TELEPHONE ENCOUNTER
.    Caller: Susana Sears    Relationship: Emergency Contact    Best call back number: [phone]    Requested Prescriptions:   Requested Prescriptions     Pending Prescriptions Disp Refills   • varenicline (Chantix Continuing Month Pak) 1 MG tablet 60 tablet 3     Sig: Take 1 tablet by mouth 2 (Two) Times a Day.        Pharmacy where request should be sent: [id number] [address] [phone] [facility] [phone] [facility]     Additional details provided by patient:     Does the patient have less than a 3 day supply:  [] Yes  [x] No    Yessenia Galindo Rep   09/14/22 13:18 EDT

## 2022-10-21 ENCOUNTER — HOSPITAL ENCOUNTER (OUTPATIENT)
Dept: CT IMAGING | Facility: HOSPITAL | Age: 52
Discharge: HOME | End: 2022-10-21
Admitting: INTERNAL MEDICINE

## 2022-10-21 DIAGNOSIS — R91.1 PULMONARY NODULE: ICD-10-CM

## 2022-10-21 PROCEDURE — 71250 CT THORAX DX C-: CPT

## 2022-10-28 ENCOUNTER — OFFICE VISIT (OUTPATIENT)
Dept: PULMONOLOGY | Facility: CLINIC | Age: 52
End: 2022-10-28

## 2022-10-28 VITALS
BODY MASS INDEX: 28.35 KG/M2 | OXYGEN SATURATION: 98 % | WEIGHT: 198 LBS | DIASTOLIC BLOOD PRESSURE: 80 MMHG | TEMPERATURE: 98.1 F | HEART RATE: 74 BPM | SYSTOLIC BLOOD PRESSURE: 132 MMHG | HEIGHT: 70 IN

## 2022-10-28 DIAGNOSIS — R91.1 PULMONARY NODULE: Primary | ICD-10-CM

## 2022-10-28 DIAGNOSIS — Z72.0 TOBACCO ABUSE: ICD-10-CM

## 2022-10-28 PROCEDURE — 99213 OFFICE O/P EST LOW 20 MIN: CPT | Performed by: INTERNAL MEDICINE

## 2022-10-28 NOTE — PROGRESS NOTES
"Follow Up Office Note       Patient Name: Tyree Sears    Referring Physician: No ref. provider found    Chief Complaint:    Chief Complaint   Patient presents with   • Lung Nodule   • Follow-up       History of Present Illness: Tyree Sears is a 52 y.o. male who is here today to follow-up care with Pulmonary.    Patient has a past medical history of tobacco abuse.  Patient currently denies any chest pain, nausea, fever, or chills.  He has quit smoking since last time I saw him.  He has no other acute complaints.    Review of Systems:   Review of Systems   Constitutional: Negative for chills, fatigue and fever.   HENT: Negative for congestion and voice change.    Eyes: Negative for blurred vision.   Respiratory: Negative for cough, shortness of breath and wheezing.    Cardiovascular: Negative for chest pain.   Skin: Negative for dry skin.   Hematological: Negative for adenopathy.   Psychiatric/Behavioral: Negative for agitation and depressed mood.       The following portions of the patient's history were reviewed and updated as appropriate: allergies, current medications, past family history, past medical history, past social history, past surgical history and problem list.    Physical Exam:  Vital Signs:   Vitals:    10/28/22 1611   BP: 132/80   Pulse: 74   Temp: 98.1 °F (36.7 °C)   SpO2: 98%  Comment: resting, room air   Weight: 89.8 kg (198 lb)   Height: 177.8 cm (70\")       Physical Exam  Vitals and nursing note reviewed.   Constitutional:       General: He is not in acute distress.     Appearance: He is well-developed and normal weight. He is not ill-appearing or toxic-appearing.   Cardiovascular:      Rate and Rhythm: Normal rate and regular rhythm.      Pulses: Normal pulses.      Heart sounds: Normal heart sounds. No murmur heard.    No gallop.   Pulmonary:      Effort: Pulmonary effort is normal.      Breath sounds: Normal breath sounds. No wheezing, rhonchi or rales.   Musculoskeletal:      " Right lower leg: No edema.      Left lower leg: No edema.   Neurological:      Mental Status: He is alert.         Immunization History   Administered Date(s) Administered   • COVID-19 (PFIZER) PURPLE CAP 08/13/2021, 09/03/2021       Results Review:   - I personally reviewed the pts imaging from personally viewed the patient's imaging from the CT scan of the chest from 10/21/2022, showed that all the nodules are currently stable, most particularly the right lower lobe nodule 7.3 mm, left lower lobe 8 mm nodule continues to be unchanged, the right middle lobe nodule that was 9 mm along the minor fissure likely a lymph node is also unchanged.  Everything else is less than that.   - CT scan of the chest on 6/21/2022 which showed a 7.5 mm left lower lobe noncalcified nodule, no other significant nodule infiltrate is noted.  No significant mediastinal adenopathy.    Assessment / Plan:   1. Left lower lobe 7.5 mm noncalcified nodule and right lower lobe 7.3 mm noncalcified nodule (6/2022) (Primary)  -Nodules all currently stable.  Plan for repeat CT scan in 6 months and then we will go back to yearly lung cancer screening.  -PFTs with the next visit    2. Tobacco abuse  -Congratulated patient on 6 months tobacco cessation.  Recommended he continue to work on cessation and maintain it over time.      Follow Up:   Return in about 6 months (around 4/28/2023) for CT Chest with next visit, PFTs.       PORFIRIO Loza, DO  Pulmonary and Critical Care Medicine  Note Electronically Signed    Part of this note may be an electronic transcription/translation of spoken language to printed text using the Dragon Dictation System.

## 2023-01-07 DIAGNOSIS — F17.210 CIGARETTE NICOTINE DEPENDENCE WITHOUT COMPLICATION: ICD-10-CM

## 2023-01-07 DIAGNOSIS — Z72.0 TOBACCO USE: ICD-10-CM

## 2023-01-09 RX ORDER — VARENICLINE TARTRATE 1 MG/1
TABLET, FILM COATED ORAL
Qty: 180 TABLET | Refills: 1 | OUTPATIENT
Start: 2023-01-09

## 2023-04-11 ENCOUNTER — HOSPITAL ENCOUNTER (OUTPATIENT)
Dept: CT IMAGING | Facility: HOSPITAL | Age: 53
Discharge: HOME | End: 2023-04-11
Admitting: INTERNAL MEDICINE
Payer: COMMERCIAL

## 2023-04-11 DIAGNOSIS — R91.1 PULMONARY NODULE: ICD-10-CM

## 2023-04-11 PROCEDURE — 71250 CT THORAX DX C-: CPT

## 2023-05-01 DIAGNOSIS — R91.1 PULMONARY NODULE: Primary | ICD-10-CM

## 2023-11-22 ENCOUNTER — HOSPITAL ENCOUNTER (OUTPATIENT)
Dept: CT IMAGING | Facility: HOSPITAL | Age: 53
Discharge: HOME | End: 2023-11-22
Admitting: INTERNAL MEDICINE
Payer: COMMERCIAL

## 2023-11-22 DIAGNOSIS — R91.1 PULMONARY NODULE: ICD-10-CM

## 2023-11-22 PROCEDURE — 71250 CT THORAX DX C-: CPT

## 2024-01-12 ENCOUNTER — OFFICE VISIT (OUTPATIENT)
Dept: FAMILY MEDICINE CLINIC | Facility: CLINIC | Age: 54
End: 2024-01-12
Payer: COMMERCIAL

## 2024-01-12 VITALS
RESPIRATION RATE: 14 BRPM | WEIGHT: 205.2 LBS | SYSTOLIC BLOOD PRESSURE: 138 MMHG | OXYGEN SATURATION: 98 % | DIASTOLIC BLOOD PRESSURE: 88 MMHG | TEMPERATURE: 98.1 F | HEART RATE: 86 BPM | BODY MASS INDEX: 27.79 KG/M2 | HEIGHT: 72 IN

## 2024-01-12 DIAGNOSIS — Z00.00 ROUTINE GENERAL MEDICAL EXAMINATION AT A HEALTH CARE FACILITY: Primary | ICD-10-CM

## 2024-01-12 DIAGNOSIS — Z01.818 PRE-OPERATIVE CLEARANCE: ICD-10-CM

## 2024-01-12 RX ORDER — LORATADINE 10 MG/1
10 TABLET ORAL DAILY
Qty: 30 TABLET | Refills: 3 | Status: SHIPPED | OUTPATIENT
Start: 2024-01-12
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
## 2024-01-16 ENCOUNTER — OFFICE VISIT (OUTPATIENT)
Dept: PULMONOLOGY | Facility: CLINIC | Age: 54
End: 2024-01-16
Payer: COMMERCIAL

## 2024-01-16 VITALS
HEIGHT: 72 IN | SYSTOLIC BLOOD PRESSURE: 188 MMHG | HEART RATE: 92 BPM | BODY MASS INDEX: 27.9 KG/M2 | OXYGEN SATURATION: 97 % | TEMPERATURE: 98 F | WEIGHT: 206 LBS | DIASTOLIC BLOOD PRESSURE: 108 MMHG

## 2024-01-16 DIAGNOSIS — Z87.891 PERSONAL HISTORY OF SMOKING: ICD-10-CM

## 2024-01-16 DIAGNOSIS — J44.9 CHRONIC OBSTRUCTIVE PULMONARY DISEASE, UNSPECIFIED COPD TYPE: Primary | ICD-10-CM

## 2024-01-16 PROCEDURE — 99214 OFFICE O/P EST MOD 30 MIN: CPT | Performed by: NURSE PRACTITIONER

## 2024-01-16 RX ORDER — ALBUTEROL SULFATE 90 UG/1
2 AEROSOL, METERED RESPIRATORY (INHALATION) EVERY 4 HOURS PRN
Qty: 6.7 G | Refills: 5 | Status: SHIPPED | OUTPATIENT
Start: 2024-01-16

## 2024-01-16 NOTE — PROGRESS NOTES
Pre-operative Clearance    Chief Complaint   Patient presents with    Left lower lobe 7.5 mm noncalcified nodule and right lower      Follow up       HPI     Tyree Sears is a pleasant 53 y.o. male who has been referred for preoperative exam.  He is planning on having a rotator cuff repair next week.  He was referred for pulmonary clearance.    He was last seen in the office by Dr. Loza in 2022.  He was referred to our office for a pulmonary nodule.  That was found on a CT of the chest in 2022.    We have followed his pulmonary nodules serially.  His last CT scan was in 2023.        He denies any respiratory illnesses since his last appointment.      He denies any breathing difficulties.  He does have difficulty bending over at times.     He denies any sputum production or hemoptysis.  He denies any chest pain or palpitations.  Denies any lower extremity edema or calf tenderness.    He is currently not using any inhalers.  He denies any reflux symptoms.    He quit smoking about 20 months ago and has a 40-pack-year history.    Past Medical History:   Diagnosis Date    Arthritis     Collar bone fracture     GERD (gastroesophageal reflux disease)     Headache     Sinusitis        Past Surgical History:   Procedure Laterality Date    SHOULDER SURGERY Right 2021    Right shoulder arthroscopy. arthroscopic biceps tendosis, open distal clavicle resection and major debridement of superior labrum, glenoid and subacromial bursa  Dr. LAURIE Crawford [facility] [place]        Family History   Problem Relation Age of Onset    Lung disease Mother     Cancer Mother     Heart disease Father     Lung disease Sister        Social History     Socioeconomic History    Marital status:    Tobacco Use    Smoking status: Former     Packs/day: 2.00     Years: 20.00     Additional pack years: 0.00     Total pack years: 40.00     Types: Cigarettes     Quit date: 5/15/2022     Years since quittin.6    Smokeless  tobacco: Never   Vaping Use    Vaping Use: Never used   Substance and Sexual Activity    Alcohol use: No    Drug use: No    Sexual activity: Yes     Partners: Female       No Known Allergies    ROS    Review of Systems   Constitutional:  Negative for activity change, chills, fever and unexpected weight loss.   HENT:  Negative for congestion, hearing loss, postnasal drip, rhinorrhea, sinus pressure, sore throat and voice change.    Eyes:  Negative for blurred vision and visual disturbance.   Respiratory:  Positive for shortness of breath. Negative for apnea, cough and wheezing.    Cardiovascular:  Negative for chest pain and palpitations.   Gastrointestinal:  Negative for abdominal pain, nausea, vomiting and GERD.   Endocrine: Negative for cold intolerance.   Genitourinary:  Negative for difficulty urinating and urinary incontinence.   Musculoskeletal:  Negative for back pain, joint swelling and myalgias.   Skin:  Negative for color change and pallor.   Allergic/Immunologic: Negative for food allergies.   Neurological:  Negative for weakness, numbness, headache and confusion.   Hematological:  Negative for adenopathy.   Psychiatric/Behavioral:  Negative for agitation, behavioral problems and depressed mood.        Vitals:    01/16/24 1445   BP: (!) 188/108   Pulse: 92   Temp: 98 °F (36.7 °C)   SpO2: 97%         Current Outpatient Medications:     loratadine (Claritin) 10 MG tablet, Take 1 tablet by mouth Daily., Disp: 30 tablet, Rfl: 3    albuterol sulfate  (90 Base) MCG/ACT inhaler, Inhale 2 puffs Every 4 (Four) Hours As Needed for Wheezing., Disp: 6.7 g, Rfl: 5    PE    Physical Exam  Vitals and nursing note reviewed.   Constitutional:       Appearance: He is well-developed. He is not diaphoretic.   HENT:      Head: Normocephalic and atraumatic.   Eyes:      Pupils: Pupils are equal, round, and reactive to light.   Neck:      Thyroid: No thyromegaly.   Cardiovascular:      Rate and Rhythm: Normal rate and  regular rhythm.      Heart sounds: Normal heart sounds. No murmur heard.     No friction rub. No gallop.   Pulmonary:      Effort: Pulmonary effort is normal. No respiratory distress.      Breath sounds: Normal breath sounds. No wheezing or rales.   Chest:      Chest wall: No tenderness.   Abdominal:      General: Bowel sounds are normal.      Palpations: Abdomen is soft.      Tenderness: There is no abdominal tenderness.   Musculoskeletal:         General: No swelling. Normal range of motion.      Cervical back: Normal range of motion and neck supple.   Lymphadenopathy:      Cervical: No cervical adenopathy.   Skin:     General: Skin is warm and dry.      Capillary Refill: Capillary refill takes less than 2 seconds.   Neurological:      Mental Status: He is alert and oriented to person, place, and time.   Psychiatric:         Mood and Affect: Mood normal.         Behavior: Behavior normal.          ARISCAT Preoperative Pulmonary Risk Index.    Age []   <= 50 years old (0 points)    [x]   51-80 years old (3 points)    []   >80 years old (16 points)       Preoperative Oxygen Saturation [x]   >= 96% (0 points)    []   91-95% (8 points)    []   <= 90% (24 points)       Other Clinical Risk Factors []   Respiratory Infection in the last month (17 points)    []   Pre-operative anemia: Hb < 10 g/dL (11 points)    []   Emergency Surgery (8 points)       Surgical Incision []   Upper abdominal (15 points)    []   Intrathoracic (24 points)       Duration of Surgery [x]   < 2 hours (0 points)    []   2-3 hours (16 points)    []   >3 hours (23 points)       Total Criteria Point Count: 3     ARISCAT risk index interpretation:      0-25 points: Low risk  1.6 % pulmonary complication rate.   26-44 points: Intermediate risk 13.3% pulmonary complication rate.    points: High risk 42.1 % pulmonary complication rate.     Postoperative Pulmonary Complications include but are not limited to: Respiratory Failure, Pulmonary  Infection, Pleural Effusion, Atelectasis, Pneumothorax Bronchospasm, Aspiration Pneumonia.    No images are attached to the encounter or orders placed in the encounter.    Full PFTs performed in the office today and reviewed by me: FVC 4.38 88% predicted, FEV1 2.72 71% predicted, FEV1/FVC 62% predicted, TLC 6.96 103% predicted, DLCO 86% predicted, mild obstruction with no postbronchodilator response, no restriction and reduced DLCO.    X-ray chest PA and lateral    Result Date: 1/10/2024  No radiographic evidence of acute cardiac or pulmonary disease. Images reviewed, interpreted, and dictated by Sabrina Talavera MD    CT Chest Low Dose Follow Up Without Contrast    Result Date: 11/24/2023  Impression: Redemonstration of multiple scattered subcentimeter solid nodules, detailed above. No new or enlarging pulmonary nodules. Hepatic steatosis. Recommendation: Continue annual screening with LDCT Lung Rads Assessment: Lung-RADS L2 - Benign appearance or <1% chance of malignancy. Electronically Signed: Collin Bajwa MD  11/24/2023 9:21 AM EST  Workstation ID: [id number]      A/P    Problem List Items Addressed This Visit    None  Visit Diagnoses       Chronic obstructive pulmonary disease, unspecified COPD type    -  Primary    Relevant Medications    albuterol sulfate  (90 Base) MCG/ACT inhaler    Personal history of smoking        Relevant Orders    CT chest low dose wo          Mr. Sears was here for follow-up of his pulmonary nodule.  He did have a CT scan performed in November that showed stable pulmonary nodules.  His next CT scan will be due in 1 year in November 2024.    We reviewed his PFTs in the office today and he does have a mild obstructive airway pattern.  He does not have much complaints of his breathing currently.  I will send him an albuterol rescue Hailer to use as needed for shortness of breath or wheezing.    Based on the ARISCAT preoperative pulmonary risk index he is at a low risk for  pulmonary complications related to his age.  We did discuss possible pulmonary complications such as respiratory failure, pulmonary infection, pleural effusion, atelectasis, pneumothorax or spasm and aspiration pneumonia.  We also discussed good pulmonary toileting such as cough/deep breathing, using incentive spirometry before and after surgery and early mobilization.  After discussing the possible pulmonary complications he is agreeable to proceed with surgery.    His blood pressure was elevated in the office today I did advise him to reach out to his PCP.  We did recheck his blood pressure after the visit and it was still elevated.  I will notify his PCP that he may need to follow-up for his high blood pressure.    He will follow-up in November with his repeat CT scan.     Level of service justified based on 32 minutes spent in patient care on this date of service including, but not limited to: preparing to see the patient, obtaining and/or reviewing history, performing medically appropriate examination, ordering tests/medicine/procedures, independently interpreting results, documenting clinical information in EHR, and counseling/education of patient/family/caregiver. (Level 4 30-39 minutes; Level 5 40-54 minutes)      SHAI Nicolas  01/16/202414:56 EST  Electronically signed     Please note that portions of this note were completed with a voice recognition program.        CC: Colby Ferguson PA Marlana L Keeton, APRN

## 2024-01-17 RX ORDER — LEVALBUTEROL TARTRATE 45 UG/1
3 AEROSOL, METERED ORAL ONCE
Status: COMPLETED | OUTPATIENT
Start: 2024-01-17 | End: 2024-01-17

## 2024-01-17 RX ADMIN — LEVALBUTEROL TARTRATE 3 PUFF: 45 AEROSOL, METERED ORAL at 14:33

## 2024-01-22 ENCOUNTER — TELEPHONE (OUTPATIENT)
Dept: FAMILY MEDICINE CLINIC | Facility: CLINIC | Age: 54
End: 2024-01-22

## 2024-01-22 NOTE — TELEPHONE ENCOUNTER
/119 per wife this morning and was 188/108 at pulmonology appointment.    He has an appt here tomorrow but wants to know if he can be prescribed medication today.

## 2024-01-22 NOTE — TELEPHONE ENCOUNTER
Caller: Susana Sears    Relationship: Emergency Contact    Best call back number: [phone]    What medication are you requesting: HYPERTENSION    What are your current symptoms: 200/119     How long have you been experiencing symptoms: SINCE LAST WEEK    Have you had these symptoms before:    [] Yes  [x] No    Have you been treated for these symptoms before:   [] Yes  [x] No    If a prescription is needed, what is your preferred pharmacy and phone number: [id number] - [address] [phone] [facility] [phone]      Additional notes: WENT TO SEE PULMONOLOGIST LAST WEEK AND THEY NOTICED THE HIGH BP. PATIENT'S APPT WAS CX TODAY BECAUSE OF OVERBOOKING. WILL MR MOHAN PRESCRIBE SOMETHING TO HELP UNTIL HE CAN BE SEEN?

## 2024-01-23 ENCOUNTER — OFFICE VISIT (OUTPATIENT)
Dept: FAMILY MEDICINE CLINIC | Facility: CLINIC | Age: 54
End: 2024-01-23
Payer: COMMERCIAL

## 2024-01-23 VITALS
BODY MASS INDEX: 27.85 KG/M2 | HEIGHT: 72 IN | WEIGHT: 205.6 LBS | DIASTOLIC BLOOD PRESSURE: 98 MMHG | TEMPERATURE: 97.9 F | RESPIRATION RATE: 16 BRPM | SYSTOLIC BLOOD PRESSURE: 168 MMHG | OXYGEN SATURATION: 99 % | HEART RATE: 82 BPM

## 2024-01-23 DIAGNOSIS — I10 PRIMARY HYPERTENSION: Primary | ICD-10-CM

## 2024-01-23 PROCEDURE — 99213 OFFICE O/P EST LOW 20 MIN: CPT | Performed by: PHYSICIAN ASSISTANT

## 2024-01-23 RX ORDER — AMLODIPINE BESYLATE AND BENAZEPRIL HYDROCHLORIDE 5; 10 MG/1; MG/1
1 CAPSULE ORAL DAILY
Qty: 30 CAPSULE | Refills: 2 | Status: SHIPPED | OUTPATIENT
Start: 2024-01-23

## 2024-01-23 NOTE — PROGRESS NOTES
Subjective   Tyree Sears is a 53 y.o. male  Hypertension (DBP staying around/above 100 and SBP in 200's)      History of Present Illness  Patient is a pleasant 53-year-old white male who comes in for follow-up of blood pressure elevations patient blood pressure running 170/100, no shortness breath no no chest pain, patient states blood pressure been elevated last couple of weeks, no leg edema.  The following portions of the patient's history were reviewed and updated as appropriate: allergies, current medications, past social history and problem list    Review of Systems   Constitutional:  Negative for appetite change, diaphoresis, fatigue and unexpected weight change.   Eyes:  Negative for visual disturbance.   Respiratory:  Negative for cough, chest tightness and shortness of breath.    Cardiovascular:  Negative for chest pain, palpitations and leg swelling.   Gastrointestinal:  Negative for diarrhea, nausea and vomiting.   Endocrine: Negative for polydipsia, polyphagia and polyuria.   Skin:  Negative for color change and rash.   Neurological:  Negative for dizziness, syncope, weakness, light-headedness, numbness and headaches.       Objective     Vitals:    01/23/24 1411   BP: 168/98   Pulse: 82   Resp: 16   Temp: 97.9 °F (36.6 °C)   SpO2: 99%       Physical Exam  Vitals and nursing note reviewed.   Constitutional:       General: He is not in acute distress.     Appearance: Normal appearance. He is well-developed. He is not ill-appearing, toxic-appearing or diaphoretic.   Neck:      Thyroid: No thyromegaly.      Vascular: No carotid bruit or JVD.   Cardiovascular:      Rate and Rhythm: Normal rate and regular rhythm.      Pulses: Normal pulses.      Heart sounds: Normal heart sounds. No murmur heard.  Pulmonary:      Effort: Pulmonary effort is normal. No respiratory distress.      Breath sounds: Normal breath sounds.   Abdominal:      Palpations: Abdomen is soft. There is no mass.      Tenderness: There  is no abdominal tenderness.   Musculoskeletal:      Cervical back: Neck supple.   Lymphadenopathy:      Cervical: No cervical adenopathy.   Skin:     General: Skin is warm and dry.   Neurological:      Mental Status: He is alert.      Sensory: No sensory deficit.         Assessment & Plan     Diagnoses and all orders for this visit:    1. Primary hypertension (Primary)  -     amLODIPine-benazepril (Lotrel) 5-10 MG per capsule; Take 1 capsule by mouth Daily.  Dispense: 30 capsule; Refill: 2         Recheck in 2 weeks     I spent 15 minutes in patient care: Reviewing records prior to the visit, examining the patient, entering orders and documentation    Part of this note may be an electronic transcription/translation of spoken language to printed text using the Dragon Dictation System.

## 2024-04-17 ENCOUNTER — TELEPHONE (OUTPATIENT)
Dept: PULMONOLOGY | Facility: CLINIC | Age: 54
End: 2024-04-17
Payer: COMMERCIAL

## 2024-04-17 DIAGNOSIS — J44.9 CHRONIC OBSTRUCTIVE PULMONARY DISEASE, UNSPECIFIED COPD TYPE: Primary | ICD-10-CM

## 2024-04-17 RX ORDER — LEVALBUTEROL TARTRATE 45 UG/1
2 AEROSOL, METERED ORAL 4 TIMES DAILY PRN
Qty: 15 G | Refills: 5 | Status: SHIPPED | OUTPATIENT
Start: 2024-04-17

## 2024-04-17 NOTE — TELEPHONE ENCOUNTER
Received fax from pharmacy that albuterol inhaler is not covered. Levalbuterol inhaler is preferred by insurance. Okay to switch? Please advise.

## 2024-04-18 DIAGNOSIS — I10 PRIMARY HYPERTENSION: ICD-10-CM

## 2024-04-18 RX ORDER — AMLODIPINE BESYLATE AND BENAZEPRIL HYDROCHLORIDE 5; 10 MG/1; MG/1
1 CAPSULE ORAL DAILY
Qty: 90 CAPSULE | Refills: 3 | Status: SHIPPED | OUTPATIENT
Start: 2024-04-18

## 2024-10-31 ENCOUNTER — HOSPITAL ENCOUNTER (OUTPATIENT)
Dept: CT IMAGING | Facility: HOSPITAL | Age: 54
Discharge: HOME | End: 2024-10-31
Admitting: NURSE PRACTITIONER
Payer: COMMERCIAL

## 2024-10-31 DIAGNOSIS — Z87.891 PERSONAL HISTORY OF SMOKING: ICD-10-CM

## 2024-10-31 PROCEDURE — 71271 CT THORAX LUNG CANCER SCR C-: CPT

## 2024-11-04 DIAGNOSIS — R91.8 MULTIPLE PULMONARY NODULES: Primary | ICD-10-CM

## 2025-02-08 DIAGNOSIS — I10 PRIMARY HYPERTENSION: ICD-10-CM

## 2025-02-10 RX ORDER — AMLODIPINE AND BENAZEPRIL HYDROCHLORIDE 5; 10 MG/1; MG/1
1 CAPSULE ORAL DAILY
Qty: 90 CAPSULE | Refills: 0 | Status: SHIPPED | OUTPATIENT
Start: 2025-02-10

## 2025-02-10 RX ORDER — LORATADINE 10 MG/1
10 TABLET ORAL DAILY
Qty: 90 TABLET | Refills: 0 | Status: SHIPPED | OUTPATIENT
Start: 2025-02-10

## 2025-04-08 ENCOUNTER — OFFICE VISIT (OUTPATIENT)
Dept: FAMILY MEDICINE CLINIC | Facility: CLINIC | Age: 55
End: 2025-04-08
Payer: COMMERCIAL

## 2025-04-08 VITALS
SYSTOLIC BLOOD PRESSURE: 174 MMHG | TEMPERATURE: 97.9 F | RESPIRATION RATE: 16 BRPM | HEART RATE: 70 BPM | WEIGHT: 219.4 LBS | DIASTOLIC BLOOD PRESSURE: 98 MMHG | OXYGEN SATURATION: 97 % | HEIGHT: 72 IN | BODY MASS INDEX: 29.72 KG/M2

## 2025-04-08 DIAGNOSIS — I10 PRIMARY HYPERTENSION: Primary | ICD-10-CM

## 2025-04-08 PROCEDURE — 99213 OFFICE O/P EST LOW 20 MIN: CPT | Performed by: PHYSICIAN ASSISTANT

## 2025-04-08 RX ORDER — OXYCODONE AND ACETAMINOPHEN 5; 325 MG/1; MG/1
1 TABLET ORAL 3 TIMES DAILY
COMMUNITY

## 2025-04-08 RX ORDER — AMLODIPINE AND BENAZEPRIL HYDROCHLORIDE 10; 20 MG/1; MG/1
1 CAPSULE ORAL DAILY
Qty: 30 CAPSULE | Refills: 11 | Status: SHIPPED | OUTPATIENT
Start: 2025-04-08

## 2025-04-08 RX ORDER — LANSOPRAZOLE 30 MG/1
30 CAPSULE, DELAYED RELEASE ORAL DAILY
COMMUNITY

## 2025-04-08 NOTE — PROGRESS NOTES
Subjective   Tyree Sears is a 55 y.o. male  Hypertension      Hypertension      History of Present Illness  The patient presents for evaluation of elevated blood pressure.    He reports a persistent elevation in his blood pressure, which he attributes to his ongoing pain management at the clinic. He experiences daily pain and stress, with particular discomfort in his knees. His blood pressure readings have been significantly high, with one instance of systolic pressure reaching 220. He has been adhering to a morning medication regimen and maintains a diet low in sodium. He has been utilizing knee braces with hot and cold packs but notes a progressive deterioration in his knee condition.    MEDICATIONS  Current: Lotrel    The following portions of the patient's history were reviewed and updated as appropriate: allergies, current medications, past social history and problem list    Review of Systems   Constitutional: Negative.    HENT: Negative.     Eyes: Negative.    Respiratory: Negative.     Cardiovascular: Negative.    Gastrointestinal: Negative.    Endocrine: Negative.    Genitourinary: Negative.    Musculoskeletal: Negative.    Skin: Negative.    Allergic/Immunologic: Negative.    Neurological: Negative.    Hematological: Negative.    Psychiatric/Behavioral: Negative.     All other systems reviewed and are negative.      Objective     Vitals:    04/08/25 1540   BP: 174/98   Pulse:    Resp:    Temp:    SpO2:        Physical Exam  Vitals and nursing note reviewed.   Constitutional:       General: He is not in acute distress.     Appearance: Normal appearance. He is well-developed. He is not ill-appearing, toxic-appearing or diaphoretic.   HENT:      Head: Normocephalic and atraumatic.      Right Ear: External ear normal.      Left Ear: External ear normal.   Eyes:      Conjunctiva/sclera: Conjunctivae normal.      Pupils: Pupils are equal, round, and reactive to light.   Neck:      Thyroid: No thyromegaly.       Vascular: No carotid bruit.   Cardiovascular:      Rate and Rhythm: Normal rate and regular rhythm.      Pulses: Normal pulses.      Heart sounds: Normal heart sounds. No murmur heard.  Pulmonary:      Effort: Pulmonary effort is normal. No respiratory distress.      Breath sounds: Normal breath sounds.   Abdominal:      General: Bowel sounds are normal.      Palpations: Abdomen is soft. There is no mass.      Tenderness: There is no abdominal tenderness.   Musculoskeletal:         General: No swelling. Normal range of motion.      Cervical back: Normal range of motion and neck supple.   Lymphadenopathy:      Cervical: No cervical adenopathy.   Skin:     General: Skin is warm and dry.      Findings: No lesion or rash.   Neurological:      Mental Status: He is alert and oriented to person, place, and time.      Cranial Nerves: No cranial nerve deficit.      Sensory: No sensory deficit.      Motor: No weakness.      Coordination: Coordination normal.      Gait: Gait normal.      Deep Tendon Reflexes: Reflexes are normal and symmetric.   Psychiatric:         Mood and Affect: Mood normal.         Behavior: Behavior normal.         Thought Content: Thought content normal.         Judgment: Judgment normal.       Physical Exam      Assessment & Plan   Assessment & Plan  1. Elevated blood pressure.  His elevated blood pressure is likely due to chronic pain and stress. He is currently on a low dose of Lotrel 5/10 mg. The dosage of Lotrel will be increased to 10/20 mg, to be taken in the morning. A prescription for the new dosage has been sent to his pharmacy. Laboratory tests have been ordered to monitor kidney function and potassium levels. He has been advised to limit his salt intake.    Diagnoses and all orders for this visit:    1. Primary hypertension (Primary)       Preventive medicine discussed, diet, exercise, healthy living discussed at length.  Discussed nutrition, physical activity, healthy weight, injury  prevention, misuse of tobacco, alcohol and drugs, dental health, mental health, immunizations, screening    Part of this note may be an electronic transcription/translation of spoken language to printed text using the Dragon Dictation System.     Patient or patient representative verbalized consent for the use of Ambient Listening during the visit with  JEWEL José for chart documentation. 4/8/2025  15:46 EDT

## 2025-04-11 ENCOUNTER — LAB (OUTPATIENT)
Facility: HOSPITAL | Age: 55
End: 2025-04-11
Payer: COMMERCIAL

## 2025-04-11 ENCOUNTER — OFFICE VISIT (OUTPATIENT)
Age: 55
End: 2025-04-11
Payer: COMMERCIAL

## 2025-04-11 VITALS
WEIGHT: 218 LBS | DIASTOLIC BLOOD PRESSURE: 128 MMHG | SYSTOLIC BLOOD PRESSURE: 210 MMHG | BODY MASS INDEX: 29.53 KG/M2 | OXYGEN SATURATION: 93 % | HEART RATE: 86 BPM | HEIGHT: 72 IN

## 2025-04-11 DIAGNOSIS — Z87.891 PERSONAL HISTORY OF SMOKING: ICD-10-CM

## 2025-04-11 DIAGNOSIS — R91.8 MULTIPLE PULMONARY NODULES: Primary | ICD-10-CM

## 2025-04-11 DIAGNOSIS — I10 PRIMARY HYPERTENSION: ICD-10-CM

## 2025-04-11 DIAGNOSIS — J44.9 CHRONIC OBSTRUCTIVE PULMONARY DISEASE, UNSPECIFIED COPD TYPE: Chronic | ICD-10-CM

## 2025-04-11 LAB
ALBUMIN SERPL-MCNC: 4.2 G/DL (ref 3.5–5.2)
ALBUMIN/GLOB SERPL: 1.4 G/DL
ALP SERPL-CCNC: 134 U/L (ref 39–117)
ALT SERPL W P-5'-P-CCNC: 65 U/L (ref 1–41)
ANION GAP SERPL CALCULATED.3IONS-SCNC: 11.1 MMOL/L (ref 5–15)
AST SERPL-CCNC: 37 U/L (ref 1–40)
BILIRUB SERPL-MCNC: 0.4 MG/DL (ref 0–1.2)
BUN SERPL-MCNC: 17 MG/DL (ref 6–20)
BUN/CREAT SERPL: 16.3 (ref 7–25)
CALCIUM SPEC-SCNC: 9.7 MG/DL (ref 8.6–10.5)
CHLORIDE SERPL-SCNC: 104 MMOL/L (ref 98–107)
CO2 SERPL-SCNC: 25.9 MMOL/L (ref 22–29)
CREAT SERPL-MCNC: 1.04 MG/DL (ref 0.76–1.27)
EGFRCR SERPLBLD CKD-EPI 2021: 84.8 ML/MIN/1.73
GLOBULIN UR ELPH-MCNC: 3 GM/DL
GLUCOSE SERPL-MCNC: 108 MG/DL (ref 65–99)
POTASSIUM SERPL-SCNC: 4.4 MMOL/L (ref 3.5–5.2)
PROT SERPL-MCNC: 7.2 G/DL (ref 6–8.5)
SODIUM SERPL-SCNC: 141 MMOL/L (ref 136–145)

## 2025-04-11 PROCEDURE — 36415 COLL VENOUS BLD VENIPUNCTURE: CPT

## 2025-04-11 PROCEDURE — 80053 COMPREHEN METABOLIC PANEL: CPT

## 2025-04-11 RX ORDER — ALBUTEROL SULFATE 90 UG/1
4 INHALANT RESPIRATORY (INHALATION) ONCE
Status: COMPLETED | OUTPATIENT
Start: 2025-04-11 | End: 2025-04-11

## 2025-04-11 RX ADMIN — ALBUTEROL SULFATE 4 PUFF: 90 INHALANT RESPIRATORY (INHALATION) at 10:58

## 2025-04-11 NOTE — PROGRESS NOTES
[facility] Pulmonary Follow up    CHIEF COMPLAINT    Shortness of breath    HISTORY OF PRESENT ILLNESS    Tyree Sears is a 55 y.o.male here today for follow-up.  He was last seen in the office by me in January 2024.  He had been seen as a preop clearance for a shoulder surgery.  He had the right shoulder surgery but it continues to have difficult with pain in the arm and was referred to a pain clinic but no other treatment was offered to him.  He is going to discuss getting a second opinion.    He was originally referred to our office in 2022 for pulmonary nodule.  His last CT scan in October 2024 showed a few small pulmonary nodules and it recommended a 6-month follow-up CT scan.  He has this set up in May 1.    He denies any new breathing difficulties.  He currently only uses Xopenex inhaler every other day or so.  He has some mild shortness of breath with exertion.    He has been having issues with his blood pressure.  His PCP recently increased his blood pressure medication.    He denies any chest pain or chest pressure.  He denies any lower extremity edema or calf tenderness.  He denies any palpitations.    He continues to take Prevacid daily and denies any reflux symptoms.    He denies any sleeping difficulties.    He quit smoking 2 years ago and has a 40-pack-year history.    Patient Active Problem List   Diagnosis    Hemangioma    Essential hypertension    Chronic obstructive pulmonary disease    Multiple pulmonary nodules    Personal history of smoking       No Known Allergies    Current Outpatient Medications:     amLODIPine-benazepril (Lotrel) 10-20 MG per capsule, Take 1 capsule by mouth Daily., Disp: 30 capsule, Rfl: 11    lansoprazole (PREVACID) 30 MG capsule, Take 1 capsule by mouth Daily., Disp: , Rfl:     levalbuterol (XOPENEX HFA) 45 MCG/ACT inhaler, Inhale 2 puffs 4 (Four) Times a Day As Needed for Wheezing., Disp: 15 g, Rfl: 5    loratadine (CLARITIN) 10 MG tablet, TAKE 1 TABLET BY MOUTH  "EVERY DAY, Disp: 90 tablet, Rfl: 0    oxyCODONE-acetaminophen (PERCOCET) 5-325 MG per tablet, Take 1 tablet by mouth 3 (Three) Times a Day., Disp: , Rfl:   No current facility-administered medications for this visit.  MEDICATION LIST AND ALLERGIES REVIEWED.    Social History     Tobacco Use    Smoking status: Former     Current packs/day: 0.00     Average packs/day: 2.0 packs/day for 20.0 years (40.0 ttl pk-yrs)     Types: Cigarettes     Start date: 5/15/2002     Quit date: 5/15/2022     Years since quittin.9    Smokeless tobacco: Never   Vaping Use    Vaping status: Never Used   Substance Use Topics    Alcohol use: No    Drug use: No       FAMILY AND SOCIAL HISTORY REVIEWED.    Review of Systems   Constitutional:  Positive for fatigue. Negative for activity change, appetite change, fever and unexpected weight change.   HENT:  Negative for congestion, postnasal drip, rhinorrhea, sinus pressure, sore throat and voice change.    Eyes:  Negative for visual disturbance.   Respiratory:  Positive for shortness of breath. Negative for cough, chest tightness and wheezing.    Cardiovascular:  Negative for chest pain, palpitations and leg swelling.   Gastrointestinal:  Negative for abdominal distention, abdominal pain, nausea and vomiting.   Endocrine: Negative for cold intolerance and heat intolerance.   Genitourinary:  Negative for difficulty urinating and urgency.   Musculoskeletal:  Negative for arthralgias, back pain and neck pain.   Skin:  Negative for color change and pallor.   Allergic/Immunologic: Negative for environmental allergies and food allergies.   Neurological:  Negative for dizziness, syncope, weakness and light-headedness.   Hematological:  Negative for adenopathy. Does not bruise/bleed easily.   Psychiatric/Behavioral:  Negative for agitation and behavioral problems.    .    BP (!) 210/128   Pulse 86   Ht 182.9 cm (72\")   Wt 98.9 kg (218 lb)   SpO2 93%   BMI 29.57 kg/m²     Immunization History "   Administered Date(s) Administered    COVID-19 (PFIZER) Purple Cap Monovalent 08/13/2021, 09/03/2021       Physical Exam  Vitals and nursing note reviewed.   Constitutional:       Appearance: He is well-developed. He is not diaphoretic.   HENT:      Head: Normocephalic and atraumatic.   Eyes:      Pupils: Pupils are equal, round, and reactive to light.   Neck:      Thyroid: No thyromegaly.   Cardiovascular:      Rate and Rhythm: Normal rate and regular rhythm.      Heart sounds: Normal heart sounds. No murmur heard.     No friction rub. No gallop.   Pulmonary:      Effort: Pulmonary effort is normal. No respiratory distress.      Breath sounds: Normal breath sounds. No wheezing or rales.   Chest:      Chest wall: No tenderness.   Abdominal:      General: Bowel sounds are normal.      Palpations: Abdomen is soft.      Tenderness: There is no abdominal tenderness.   Musculoskeletal:         General: No swelling. Normal range of motion.      Cervical back: Normal range of motion and neck supple.   Lymphadenopathy:      Cervical: No cervical adenopathy.   Skin:     General: Skin is warm and dry.      Capillary Refill: Capillary refill takes less than 2 seconds.   Neurological:      Mental Status: He is alert and oriented to person, place, and time.   Psychiatric:         Mood and Affect: Mood normal.         Behavior: Behavior normal.           RESULTS    Spirometry Interpretation: FVC 4.50 98% predicted, FEV1 2.72 76% predicted, FEV1/FVC 60% predicted, TLC 7.67 112% predicted, DLCO 94% predicted, mild obstruction with no postbronchodilator response, no restriction and normal diffusion.    Chest PA/lateral: Official report pending    PROBLEM LIST    Problem List Items Addressed This Visit          Pulmonary and Pneumonias    Chronic obstructive pulmonary disease (Chronic)    Relevant Medications    albuterol sulfate HFA (PROVENTIL HFA;VENTOLIN HFA;PROAIR HFA) inhaler 4 puff (Completed)    Multiple pulmonary nodules -  Primary    Relevant Medications    albuterol sulfate HFA (PROVENTIL HFA;VENTOLIN HFA;PROAIR HFA) inhaler 4 puff (Completed)    Other Relevant Orders    XR Chest PA & Lateral    Complete PFT - Pre & Post Bronchodilator (Completed)       Tobacco    Personal history of smoking         DISCUSSION    Mr. Sears was here for follow-up.  He seems to be doing okay from a pulmonary standpoint.  He will continue to use the Xopenex as needed.  We did review his PFTs in the office today he does have a mild obstructive airway pattern.  He is going to try Stiolto 2 puffs daily.  I did provide him with samples in the office today.  We showed him how to use the inhaler in the office as well.    We reviewed his chest x-ray in the office today and official report is pending.  I will notify him of any abnormalities.    He does have a few small pulmonary nodules and we are following up on this with a repeat CT scan of the chest on May 1.   I will contact him with the report.    He quit smoking 2 years ago and has a 40-pack-year history.  He does qualify for yearly CT screenings.  Once I have seen his CT scan in May we will plan a follow-up CT scan at that point.    His blood pressure was very high in the office today after the visit was over we rechecked his blood pressure and it was still elevated.  I did advise him to monitor his blood pressure for the next couple hours and and if it did not go down he may need to go to the ED.  He is also going to follow-up with his PCP.  He just had his medication adjusted yesterday.    We will schedule him a follow-up in 1 year.    I personally spent a total of 32 minutes on patient visit today including chart review, face to face with the patient obtaining the history and physical exam, review of pertinent images and tests, counseling and discussion and/or coordination of care as described above, and documentation.  Total time excludes time spent on other separate services such as performing  procedures or test interpretation, if applicable.        Brenda Ortiz, APRN  04/11/202513:01 EDT  Electronically signed     Please note that portions of this note were completed with a voice recognition program.        CC: Colby Ferguson PA

## 2025-04-29 ENCOUNTER — RESULTS FOLLOW-UP (OUTPATIENT)
Dept: FAMILY MEDICINE CLINIC | Facility: CLINIC | Age: 55
End: 2025-04-29
Payer: COMMERCIAL

## 2025-04-29 NOTE — LETTER
Tyree Sears  [address] [place] [id number]    April 29, 2025     Dear Mr. Sears:    Below are the results from your recent visit:    Resulted Orders   Comprehensive metabolic panel   Result Value Ref Range    Glucose 108 (H) 65 - 99 mg/dL    BUN 17 6 - 20 mg/dL    Creatinine 1.04 0.76 - 1.27 mg/dL    Sodium 141 136 - 145 mmol/L    Potassium 4.4 3.5 - 5.2 mmol/L    Chloride 104 98 - 107 mmol/L    CO2 25.9 22.0 - 29.0 mmol/L    Calcium 9.7 8.6 - 10.5 mg/dL    Total Protein 7.2 6.0 - 8.5 g/dL    Albumin 4.2 3.5 - 5.2 g/dL    ALT (SGPT) 65 (H) 1 - 41 U/L    AST (SGOT) 37 1 - 40 U/L    Alkaline Phosphatase 134 (H) 39 - 117 U/L    Total Bilirubin 0.4 0.0 - 1.2 mg/dL    Globulin 3.0 gm/dL    A/G Ratio 1.4 g/dL    BUN/Creatinine Ratio 16.3 7.0 - 25.0    Anion Gap 11.1 5.0 - 15.0 mmol/L    eGFR 84.8 >60.0 mL/min/1.73       Overall your blood work look good, slight elevation in one of your liver enzymes and a slight increase in your blood sugar but not at diabetic levels.  Continue to watch your diet and exercise    If you have any questions or concerns, please don't hesitate to call.         Sincerely,        JEWEL José

## 2025-05-09 DIAGNOSIS — J44.9 CHRONIC OBSTRUCTIVE PULMONARY DISEASE, UNSPECIFIED COPD TYPE: Primary | ICD-10-CM

## 2025-05-09 RX ORDER — TIOTROPIUM BROMIDE AND OLODATEROL 3.124; 2.736 UG/1; UG/1
2 SPRAY, METERED RESPIRATORY (INHALATION)
Qty: 4 G | Refills: 11 | Status: SHIPPED | OUTPATIENT
Start: 2025-05-09

## 2025-05-09 NOTE — TELEPHONE ENCOUNTER
Pt's spouse called stating that pt fells that the Stiolto sample is working well. I have sent this into [facility] for him. She verbalized understanding and appreciation.

## 2025-05-15 ENCOUNTER — PATIENT OUTREACH (OUTPATIENT)
Dept: OTHER | Facility: HOSPITAL | Age: 55
End: 2025-05-15
Payer: COMMERCIAL

## 2025-05-15 NOTE — SIGNIFICANT NOTE
DIAMOND patient to get his CT chest rescheduled.  Confirmed date, time, location for his CT chest.  Provided him with phone number for central scheduling if he needs to change his appointment time.  Patient wrote this information down and v/u.

## 2025-05-28 ENCOUNTER — HOSPITAL ENCOUNTER (OUTPATIENT)
Facility: HOSPITAL | Age: 55
Discharge: HOME | End: 2025-05-28
Admitting: NURSE PRACTITIONER
Payer: COMMERCIAL

## 2025-05-28 DIAGNOSIS — R91.8 MULTIPLE PULMONARY NODULES: ICD-10-CM

## 2025-05-28 PROCEDURE — 71250 CT THORAX DX C-: CPT

## 2025-07-14 DIAGNOSIS — J44.9 CHRONIC OBSTRUCTIVE PULMONARY DISEASE, UNSPECIFIED COPD TYPE: ICD-10-CM

## 2025-07-14 RX ORDER — TIOTROPIUM BROMIDE AND OLODATEROL 3.124; 2.736 UG/1; UG/1
2 SPRAY, METERED RESPIRATORY (INHALATION)
Qty: 12 G | Refills: 3 | Status: SHIPPED | OUTPATIENT
Start: 2025-07-14

## 2025-07-18 ENCOUNTER — TELEPHONE (OUTPATIENT)
Dept: FAMILY MEDICINE CLINIC | Facility: CLINIC | Age: 55
End: 2025-07-18
Payer: COMMERCIAL

## 2025-07-18 NOTE — TELEPHONE ENCOUNTER
Caller: MARTY WITH [facility]    Best call back number:  [phone]       What was the call regarding: MARTY IS TRYING TO REACH THE PATIENT FOR CASE MANAGEMENT